# Patient Record
Sex: FEMALE | Race: WHITE | NOT HISPANIC OR LATINO | Employment: FULL TIME | URBAN - NONMETROPOLITAN AREA
[De-identification: names, ages, dates, MRNs, and addresses within clinical notes are randomized per-mention and may not be internally consistent; named-entity substitution may affect disease eponyms.]

---

## 2021-04-21 ENCOUNTER — OFFICE VISIT (OUTPATIENT)
Dept: OTOLARYNGOLOGY | Facility: CLINIC | Age: 51
End: 2021-04-21

## 2021-04-21 VITALS
WEIGHT: 166.6 LBS | BODY MASS INDEX: 27.76 KG/M2 | HEART RATE: 76 BPM | SYSTOLIC BLOOD PRESSURE: 151 MMHG | HEIGHT: 65 IN | DIASTOLIC BLOOD PRESSURE: 94 MMHG

## 2021-04-21 DIAGNOSIS — H81.01 MENIERE DISEASE, RIGHT: ICD-10-CM

## 2021-04-21 DIAGNOSIS — R42 DIZZINESS: ICD-10-CM

## 2021-04-21 DIAGNOSIS — H81.311 AUDITORY VERTIGO, RIGHT: Primary | ICD-10-CM

## 2021-04-21 PROCEDURE — 99204 OFFICE O/P NEW MOD 45 MIN: CPT | Performed by: OTOLARYNGOLOGY

## 2021-04-21 RX ORDER — MECLIZINE HYDROCHLORIDE 25 MG/1
25 TABLET ORAL EVERY 6 HOURS PRN
COMMUNITY
Start: 2021-02-22

## 2021-04-21 RX ORDER — HYDROCHLOROTHIAZIDE 12.5 MG/1
12.5 CAPSULE, GELATIN COATED ORAL EVERY MORNING
Qty: 30 CAPSULE | Refills: 3 | Status: SHIPPED | OUTPATIENT
Start: 2021-04-21

## 2021-04-21 RX ORDER — ASPIRIN 81 MG/1
81 TABLET ORAL 2 TIMES DAILY
COMMUNITY

## 2021-04-21 RX ORDER — CETIRIZINE HYDROCHLORIDE, PSEUDOEPHEDRINE HYDROCHLORIDE 5; 120 MG/1; MG/1
TABLET, FILM COATED, EXTENDED RELEASE ORAL
COMMUNITY
Start: 2021-03-29

## 2021-04-21 RX ORDER — ATORVASTATIN CALCIUM 20 MG/1
TABLET, FILM COATED ORAL
COMMUNITY
Start: 2021-03-23

## 2021-04-21 RX ORDER — ONDANSETRON 4 MG/1
4 TABLET, FILM COATED ORAL EVERY 6 HOURS PRN
COMMUNITY
Start: 2021-02-22

## 2021-04-21 RX ORDER — PREDNISONE 20 MG/1
TABLET ORAL
COMMUNITY
Start: 2021-03-29

## 2021-04-21 RX ORDER — METOPROLOL SUCCINATE 50 MG/1
50 TABLET, EXTENDED RELEASE ORAL DAILY
COMMUNITY

## 2021-04-21 NOTE — PROGRESS NOTES
[facility] [place] AND [place]ECK SURGERY CLINIC NOTE    Chief Complaint   Patient presents with   • Dizziness          HPI   New Patient  Accompanied by:  No one  Laura AGUILAR is a  51 y.o. female with dizziness.  She had first  Attack in 2012. She was told BPPV. Had attack with waking. She was given Epleys but made worse. She was treted like this for 8 yrs.  She would wake with googly eyes.  Last Feb,  She was at work. Head spun, had headache. She was hurting and nauseated.  Has had CT and MR scan.   Spinning last Feb, to the right.  Hearing- no  Change with episode  Pressure- she feels bith sides stuffed with cotton.  She was told stroke on CT and MRI.  She will stay still and take Antivert. Had 2 last year. This Feb, recurred. She now has head spinning, nausea and vomiting. Uses medications and patch. Will last 3-6 days. She will then resolve. She does not have headache. Feels like head has pressure.  Smoke- none  Drink- none  Rx- she has recently had Antivert and Zofran, Patch, Zyrtec, Pred. Pred makes her feel drunk.  She will stop medications when feeling better.      History     Last Reviewed by Cliff Zamora Jr., MD on 4/21/2021 at  2:54 PM    Sections Reviewed    Medical, Family, Tobacco, Surgical      Problem list reviewed by Cliff Zamora Jr., MD on 4/21/2021 at  2:54 PM  Medicines reviewed by Cliff Zamora Jr., MD on 4/21/2021 at  2:54 PM  Allergies reviewed by Cliff Zamora Jr., MD on 4/21/2021 at  2:54 PM         Vital Signs:   Heart Rate:  [76] 76  BP: (151)/(94) 151/94    Physical Exam  Vitals reviewed.   Constitutional:       Appearance: Normal appearance. She is well-developed and normal weight.   HENT:      Head: Normocephalic and atraumatic.      Right Ear: Hearing, tympanic membrane, ear canal and external ear normal.      Left Ear: Hearing, tympanic membrane, ear canal and external ear normal.      Nose: Nose normal.   Eyes:      General: Lids are normal.  Gaze aligned appropriately.      Extraocular Movements: Extraocular movements intact.      Conjunctiva/sclera: Conjunctivae normal.   Neck:      Thyroid: No thyroid mass or thyromegaly.      Vascular: No carotid bruit.      Trachea: Trachea and phonation normal.   Pulmonary:      Effort: Pulmonary effort is normal. No respiratory distress.      Breath sounds: No stridor.   Musculoskeletal:         General: Normal range of motion.      Cervical back: Normal range of motion.   Lymphadenopathy:      Cervical: No cervical adenopathy.   Skin:     Findings: No rash.   Neurological:      General: No focal deficit present.      Mental Status: She is alert and oriented to person, place, and time.      Cranial Nerves: Cranial nerves are intact. No cranial nerve deficit.   Psychiatric:         Attention and Perception: Attention and perception normal.         Mood and Affect: Mood and affect normal.         Behavior: Behavior normal. Behavior is cooperative.         Thought Content: Thought content normal.         Cognition and Memory: Cognition and memory normal.         Judgment: Judgment normal.             SnapShot   Notes   Encounters  Labs   Imaging   [email or web address]   Rslt Rev   :23}    Result Review    RESULTS REVIEW:    I have reviewed the patients old records in the chart.  The following results/records were reviewed:  I reviewed the patient's new imaging results and agree with the interpretation.  I have personally reviewed the audiogram with normal hearing.    HIM DELORIS Authorization - ENT - AUDIOLOGIC REPORT (04/21/2021)  REFERRAL/PRE-AUTH MRN - SCAN - ENT REFERRAL (03/17/2021)            Assessment:        Diagnosis Plan   1. Auditory vertigo, right      Possible Migrainous vertig   2. Meniere disease, right      Possible   3. Dizziness      in between vertigo episodes              Plan:        Conservative management.  Patient has a history that is more consistent with either Ménière syndrome or migrainous vertigo.   She does not have a history consistent with benign positional vertigo.  She is not steroid responsive as well, as she is worse on prednisone.  I will begin Ménière's regimen at this point in time.  I have discussed low-sodium diet, vestibular exercises, meclizine and Microzide.  I feel the patient should continue for evaluation by neurology to see if she has some component of migrainous vertigo and would benefit from migraine type medications.  I will follow closely since her MRI so far is negative.  Microzide daily  Meniere's regimen  Low Na diet  Exercise, vest  Continue Meclizine  Neurology referral per PCP     New Medications Ordered This Visit   Medications   • hydroCHLOROthiazide (MICROZIDE) 12.5 MG capsule     Sig: Take 1 capsule by mouth Every Morning.     Dispense:  30 capsule     Refill:  3          MY CHART:  Patient is Encouraged to enroll in My Chart  Encouraged to review data and findings in My Chart    Patient understand(s) and agree(s) with the treatment plan as described.    Return in about 6 weeks (around 6/2/2021) for Recheck Dizziness.            Cliff Zamora Jr, MD  04/21/21  14:59 CDT

## 2021-04-21 NOTE — PATIENT INSTRUCTIONS
"MENIERE'S REGIMEN:  Try to limit total sodium (salt intake to 2000 milligrams a day (you will need to look at packaging on foods and may have to look on the internet for sodium content of fresh    foods and vegetables  Low fat, low cholesterol diet  Avoid alcohol  Avoid Caffeine and other stimulants  Avoid recreational drugs  Vestibular exercises, inner ear exercises  Regular exercise, preferably aerobic if tolerated  Make sure you are doing all you can to optimize other medical conditions  Reduce high blood pressure, Control Diabetes, etc)      LOW SODIUM DIET:  Restrict total daily sodium intake to  2000 mg of sodium a day  Use distilled water to drink and cook if needed  Please look at labelling to determine the sodium content of foods  Use the internet to determine sodium content od unlabelled foods, especially the more common foods you consume  Avoid processed foods  Eat fresh vegetables and meats  (see Meniere's instruction sheet)    Use Antivert 2 times daily as needed for dizziness  Microzide daily in the AM.    LOW SODIUM DIET:  Restrict total daily sodium intake to  2000 mg of sodium a day  Use distilled water to drink and cook if needed  Please look at labelling to determine the sodium content of foods  Use the internet to determine sodium content od unlabelled foods, especially the more common foods you consume  Avoid processed foods  Eat fresh vegetables and meats  (see Meniere's instruction sheet)      VESTIBULAR EXERCISES:    Goals:  >To develop proprioceptive and visual mechanisms to compensate for a disturbance in balance function (labyrinthine system)  >To improve muscle coordination in general  T>o practice balancing under everyday conditions with special attention to using the eyes,  muscle and joint senses  >To train the movement of the eyes independent of the head  >To loosen the muscles of the neck and shoulder to overcome the protective muscular spasm and tendency to move \"in one piece\"  >To " practice head movements that cause dizziness and thus gradually overcome the disability  >To encourage the restoration of self-confidence and easy spontaneous motion     Exercise Program:  A. Eye exercises (while seated in bed). Perform 5 to 10 minutes at least 5 times each day; first do slowly, then quickly. Perform 20 times each.  1.     Up and down  2.     Side to side  3.     Diagonal  4.     Focus on finger moving from 3 feet to one foot from face    B. Head exercises. To be done at first slowly with eyes open in primary position, then quickly. Later do with eyes closed. Perform 20 times each.  1.     Bending forward and backward  2.     Turning from side to side  3.     Tilting from side to side  4.     Diagonal movements    C. Coordinate the movement of head and eyes in the same direction as in B.    D. Shoulder shrugging and circling. Perform 20 times each.    E. While seated, bend forward and  objects from the ground. Perform 20 times.    F.  Standing exercises. Perform 20 times each.  1. Repeat section A  2. Change from a sitting to a standing position with the eyes open and shut.  3. Throw ball from hand to hand, above eye level             4. Throw ball from hand to hand, under knee  5. Change form from sitting to standing, turning around in between     Full activity: Perform 10 times each.  1. Walk across room with eyes open, then closed  2. Walk up and down a slope with eyes open, then closed  3. Walk up and down steps with eyes open, then closed  4. Sit up and lie down in bed  5. Stand up and sit down in a chair  6. Recover balance when pushed in any direction  7. Throw and catch a ball  8. Any game involving stooping, straining and aiming     The following are of value in rehabilitating patients with balance problems:  1. A light cane may be used, not for walking, but to provide additional proprioceptive            orienting input.  2. While walking, the patient should not look down, but  rather select a distant point for          fixation.  3. Night-lights should be left on in the patient’s home.  4. In-patients with cervical spine problems, especially in those that head turning        increase unsteadiness, the use of soft foam cervical collar limits motion and improves         stability. Exercises should be modified accordingly.  5. Mild central stimulants (Ritalin, Caffeine) are useful in alerting the patient to respond       quickly to orienting input.  6. Optimal visual correction should be achieved and maintained. If cataract surgery is           being performed, then contact lens are recommended to avoid optical distortion.  7.  Extreme fatigue and stress is to be avoided, as this may worsen the dizziness.  8.  Sedatives, vestibular suppressants and tranquilizers (Valium, Phenergan, Antivert, Dramamine, Klonopin, etc.) are to be avoided, as this may slow compensation by the brain and cause drowsiness.      CONTACT INFORMATION:  The main office phone number is [phone]. For emergencies after hours and on weekends, this number will convert over to our answering service and the on call provider will answer. Please try to keep non emergent phone calls/ questions to office hours 9am-5pm Monday through Friday.     [employer]  As an alternative, you can sign up and use the Epic MyChart system for more direct and quicker access for non emergent questions/ problems.  [facility] Deskidea allows you to send messages to your doctor, view your test results, renew your prescriptions, schedule appointments, and more. To sign up, go to [employer] and click on the Sign Up Now link in the New User? box. Enter your Desk[employer] Activation Code exactly as it appears below along with the last four digits of your Social Security Number and your Date of Birth () to complete the sign-up process. If you do not sign up before the expiration date, you must request a new code.    [employer] Activation  Code: [email or web address]  Expires: 5/21/2021  2:13 PM    If you have questions, you can email [email or web address] or call [phone] to talk to our [employer] staff. Remember, [employer] is NOT to be used for urgent needs. For medical emergencies, dial 911.

## 2021-06-02 ENCOUNTER — OFFICE VISIT (OUTPATIENT)
Dept: OTOLARYNGOLOGY | Facility: CLINIC | Age: 51
End: 2021-06-02

## 2021-06-02 VITALS
HEIGHT: 65 IN | SYSTOLIC BLOOD PRESSURE: 131 MMHG | WEIGHT: 162 LBS | BODY MASS INDEX: 26.99 KG/M2 | DIASTOLIC BLOOD PRESSURE: 85 MMHG | HEART RATE: 63 BPM

## 2021-06-02 DIAGNOSIS — H81.311 AUDITORY VERTIGO, RIGHT: Primary | ICD-10-CM

## 2021-06-02 DIAGNOSIS — R42 DIZZINESS: ICD-10-CM

## 2021-06-02 DIAGNOSIS — H81.01 MENIERE DISEASE, RIGHT: ICD-10-CM

## 2021-06-02 PROCEDURE — 99213 OFFICE O/P EST LOW 20 MIN: CPT | Performed by: OTOLARYNGOLOGY

## 2021-06-02 NOTE — PROGRESS NOTES
[facility] Otolaryngology Head and Neck Surgery  CLINIC NOTE    Chief Complaint   Patient presents with   • Follow-up     recheck dizziness          HPI   Follow up  Accompanied by:  No one  Laura Melo is a 51 y.o. female who presents for follow up with no acute complaints. She was watching salt but has loosened the restrictions.  She is status post No surgery found on No surgery found.        History     Last Reviewed by Cliff Zamora Jr., MD on 6/2/2021 at  9:24 AM    Sections Reviewed    Medical, Family, Tobacco, Surgical      Problem list reviewed by Cliff Zamora Jr., MD on 6/2/2021 at  9:24 AM  Medicines reviewed by Cliff Zamora Jr., MD on 6/2/2021 at  9:24 AM  Allergies reviewed by Cliff Zamora Jr., MD on 6/2/2021 at  9:24 AM         Vital Signs:   Heart Rate:  [63] 63  BP: (131)/(85) 131/85    Physical Exam  Vitals reviewed.   Constitutional:       Appearance: Normal appearance. She is well-developed and normal weight.   HENT:      Head: Normocephalic and atraumatic.      Right Ear: Hearing, tympanic membrane, ear canal and external ear normal.      Left Ear: Hearing, tympanic membrane, ear canal and external ear normal.      Nose: Nose normal.   Eyes:      General: Lids are normal. Gaze aligned appropriately.      Extraocular Movements: Extraocular movements intact.      Conjunctiva/sclera: Conjunctivae normal.   Neck:      Thyroid: No thyroid mass or thyromegaly.      Vascular: No carotid bruit.      Trachea: Trachea and phonation normal.   Pulmonary:      Effort: Pulmonary effort is normal. No respiratory distress.      Breath sounds: No stridor.   Musculoskeletal:         General: Normal range of motion.      Cervical back: Normal range of motion.   Lymphadenopathy:      Cervical: No cervical adenopathy.   Skin:     Findings: No rash.   Neurological:      General: No focal deficit present.      Mental Status: She is alert and oriented to person,  place, and time.      Cranial Nerves: Cranial nerves are intact. No cranial nerve deficit.   Psychiatric:         Attention and Perception: Attention and perception normal.         Mood and Affect: Mood and affect normal.         Behavior: Behavior normal. Behavior is cooperative.         Thought Content: Thought content normal.         Cognition and Memory: Cognition and memory normal.         Judgment: Judgment normal.             SnapShot   Notes   Encounters  Labs   Imaging   [employer]   Rslt Rev   :23}    Result Review    RESULTS REVIEW:    I have reviewed the patients old records in the chart.            Assessment:        Diagnosis Plan   1. Auditory vertigo, right      Resolved   2. Meniere disease, right      Improved control   3. Dizziness      Resolved              Plan:        Continue current management plan.  Patient doing well on current regimen. She is exercising now.  Meniere's regimen  Diet- Low Na  Exercise             MY CHART:  Patient is Encouraged to enroll in My Chart  Encouraged to review data and findings in My Chart    Patient understand(s) and agree(s) with the treatment plan as described.    Return in about 6 months (around 12/2/2021) for Recheck Meniere's.            Cliff Zamora Jr, MD  06/02/21  09:25 CDT

## 2021-06-02 NOTE — PATIENT INSTRUCTIONS
JORJE'S REGIMEN:  Try to limit total sodium (salt intake to 2000 milligrams a day (you will need to look at packaging on foods and may have to look on the internet for sodium content of fresh    foods and vegetables  Low fat, low cholesterol diet  Avoid alcohol  Avoid Caffeine and other stimulants  Avoid recreational drugs  Vestibular exercises, inner ear exercises  Regular exercise, preferably aerobic if tolerated  Make sure you are doing all you can to optimize other medical conditions  Reduce high blood pressure, Control Diabetes, etc)    LOW SODIUM DIET:  Restrict total daily sodium intake to  2000 mg of sodium a day  Use distilled water to drink and cook if needed  Please look at labelling to determine the sodium content of foods  Use the internet to determine sodium content od unlabelled foods, especially the more common foods you consume  Avoid processed foods  Eat fresh vegetables and meats  (see Jorje's instruction sheet)    Diet  Exercise    CONTACT INFORMATION:  The main office phone number is [phone]. For emergencies after hours and on weekends, this number will convert over to our answering service and the on call provider will answer. Please try to keep non emergent phone calls/ questions to office hours 9am-5pm Monday through Friday.     [employer]  As an alternative, you can sign up and use the Epic MyChart system for more direct and quicker access for non emergent questions/ problems.  [facility] [employer] allows you to send messages to your doctor, view your test results, renew your prescriptions, schedule appointments, and more. To sign up, go to [employer] and click on the Sign Up Now link in the New User? box. Enter your [employer] Activation Code exactly as it appears below along with the last four digits of your Social Security Number and your Date of Birth () to complete the sign-up process. If you do not sign up before the expiration date, you must request a new  code.    [employer] Activation Code: [email or web address]  Expires: 7/2/2021  9:04 AM    If you have questions, you can email [email or web address] or call [phone] to talk to our [employer] staff. Remember, [employer] is NOT to be used for urgent needs. For medical emergencies, dial 911.

## 2021-12-03 ENCOUNTER — OFFICE VISIT (OUTPATIENT)
Dept: OTOLARYNGOLOGY | Facility: CLINIC | Age: 51
End: 2021-12-03

## 2021-12-03 VITALS
DIASTOLIC BLOOD PRESSURE: 86 MMHG | BODY MASS INDEX: 27.16 KG/M2 | SYSTOLIC BLOOD PRESSURE: 127 MMHG | WEIGHT: 163 LBS | TEMPERATURE: 97.7 F | HEIGHT: 65 IN | HEART RATE: 92 BPM

## 2021-12-03 DIAGNOSIS — H81.311 AUDITORY VERTIGO, RIGHT: Primary | ICD-10-CM

## 2021-12-03 DIAGNOSIS — H61.21 IMPACTED CERUMEN OF RIGHT EAR: ICD-10-CM

## 2021-12-03 DIAGNOSIS — R42 DIZZINESS: ICD-10-CM

## 2021-12-03 PROCEDURE — 69210 REMOVE IMPACTED EAR WAX UNI: CPT | Performed by: EMERGENCY MEDICINE

## 2021-12-03 PROCEDURE — 99213 OFFICE O/P EST LOW 20 MIN: CPT | Performed by: EMERGENCY MEDICINE

## 2021-12-03 NOTE — PATIENT INSTRUCTIONS
CONTACT INFORMATION:  The main office phone number is [phone]. For emergencies after hours and on weekends, this number will convert over to our answering service and the on call provider will answer. Please try to keep non emergent phone calls/ questions to office hours 9am-5pm Monday through Friday.     [employer]  As an alternative, you can sign up and use the Epic MyChart system for more direct and quicker access for non emergent questions/ problems.  [facility] Senseg allows you to send messages to your doctor, view your test results, renew your prescriptions, schedule appointments, and more. To sign up, go to [employer] and click on the Sign Up Now link in the New User? box. Enter your [employer] Activation Code exactly as it appears below along with the last four digits of your Social Security Number and your Date of Birth () to complete the sign-up process. If you do not sign up before the expiration date, you must request a new code.    Senseg Activation Code: [email or web address]  Expires: 2022  8:54 AM    If you have questions, you can email [email or web address] or call [phone] to talk to our [employer] staff. Remember, Senseg is NOT to be used for urgent needs. For medical emergencies, dial 911.

## 2021-12-03 NOTE — PROGRESS NOTES
GEORGIA Pinto  [facility] EAR NOSE & THROAT  [address], [address]  [place] [address]  Fax [phone]  Phone [phone]    Visit Type: FOLLOW UP   Chief Complaint   Patient presents with   • Dizziness        HPI  She presents for a follow up evaluation. She reports dizziness has resolved. She was evaluated by neurology who does not believe her dizziness was related to her ears.  She has been taking zyrtec and microzide, she would like to stop taking microzide due to dry mouth.        History     Last Reviewed by Margaret Ngo APRN on 12/3/2021 at  9:34 AM    Sections Reviewed    Medical, Family, Tobacco, Surgical      Problem list reviewed by Margaret Ngo APRN on 12/3/2021 at  9:34 AM  Medicines reviewed by Margaret Ngo APRN on 12/3/2021 at  9:34 AM  Allergies reviewed by Margaret Ngo APRN on 12/3/2021 at  9:34 AM        Vital Signs:   Temp:  [97.7 °F (36.5 °C)] 97.7 °F (36.5 °C)  Heart Rate:  [92] 92  BP: (127)/(86) 127/86  ENT Physical Exam  Constitutional  Appearance: patient appears well-developed, well-nourished and well-groomed,  Communication/Voice: communication appropriate for developmental age; vocal quality normal;  Ear  Hearing: intact;  Auricles: right auricle normal; left auricle normal;  External Mastoids: right external mastoid normal; left external mastoid normal;  Ear Canals: right ear canal impacted cerumen observed; left ear canal normal;  Tympanic Membranes: left tympanic membrane normal;     Ear Cerumen Removal    Date/Time: 12/3/2021 9:37 AM  Performed by: Margaret Ngo APRN  Authorized by: Margaret Ngo APRN   Consent: Verbal consent obtained.  Consent given by: patient  Patient understanding: patient states understanding of the procedure being performed  Patient identity confirmed: verbally with patient    Anesthesia:  Local Anesthetic: none  Location details: right ear  Patient  tolerance: patient tolerated the procedure well with no immediate complications  Comments: TM intact  Procedure type: instrumentation, alligator forceps, curette   Sedation:  Patient sedated: no           Result Review    RESULTS REVIEW    I have reviewed the patients old records in the chart.     Assessment/Plan    Diagnoses and all orders for this visit:    1. Auditory vertigo, right (Primary)  -     Comprehensive Hearing Test; Future    2. Dizziness  -     Comprehensive Hearing Test; Future    3. Impacted cerumen of right ear    Other orders  -     Ear Cerumen Removal            We will wean patient off microzide due to side effects.  Audio in 6 months.  If she continues to do well with no evidence of hearing loss or dizziness, she may follow up as needed.      Return in about 6 months (around 6/3/2022) for Follow up with Audiogram.      Margaret Ngo, APRN  12/03/21  09:37 CST

## 2022-06-02 NOTE — PROGRESS NOTES
AUDIOMETRIC EVALUATION      Name:  Laura Melo  :  1970  Age:  52 y.o.  Date of Evaluation:  2022       History:  Reason for visit:  Ms. Melo is seen today at the request of GEORGIA Durant for a hearing evaluation. Patient was seen by ENT provider on 2021 for vertigo and impacted cerumen, right ear. Patient reports she has not had vertigo subsided back in 2021. Patient states she has been slowly taking herself off her dizziness medicine and has not had any problems since. [facility] She does not think her hearing has changed.    PE Tubes:  no, both ears   Other otologic surgical history: no, both ears  Tinnitus:  no, both ears  Dizziness:  no  Noise Exposure: no  Aural Fullness:  no, both ears  Otalgia: no, both ears  Family history of hearing loss: yes, father and mother  Other significant history: high blood pressure      EVALUATION:          RESULTS:    Otoscopic Evaluation:  Bilateral: clear canal, tympanic membrane visualized     Tympanometry (226 Hz):  Bilateral: Type A- normal    Pure Tone Audiometry:    Bilateral: mild high frequency hearing loss at 6kHz    Speech Audiometry:   Bilateral: Speech Reception Threshold (SRT) was obtained at 15 dBHL  Word Recognition scores- excellent/within normal limits (90 - 100%) using NU-6 List 4A, 25 words    IMPRESSIONS:  Tympanometry showed normal middle ear pressure and static compliance, for both ears. Pure tone thresholds for both ears show a mild high frequency hearing loss at 6kHz. Hearing loss is likely sensorineural due to normal tympanograms. No significant changes compared to audio in , however note  audio did not test 6kHz. Patient was counseled with regard to the findings.    Diagnosis:  1. High frequency hearing loss, bilateral         RECOMMENDATIONS/PLAN:  Follow-up recommendations per GEORGIA Durant    Return for audiologic testing if noticing  changes in hearing or concerns arise  Use communication strategies  Use hearing protection around loud noises     EDUCATION:  Discussed results and recommendations with patient. Questions were addressed and the patient was encouraged to contact our department should concerns arise.        RACHNA López  Licensed Audiologist

## 2022-06-03 ENCOUNTER — PROCEDURE VISIT (OUTPATIENT)
Dept: OTOLARYNGOLOGY | Facility: CLINIC | Age: 52
End: 2022-06-03

## 2022-06-03 ENCOUNTER — OFFICE VISIT (OUTPATIENT)
Dept: OTOLARYNGOLOGY | Facility: CLINIC | Age: 52
End: 2022-06-03

## 2022-06-03 VITALS
BODY MASS INDEX: 27.49 KG/M2 | HEIGHT: 65 IN | DIASTOLIC BLOOD PRESSURE: 81 MMHG | SYSTOLIC BLOOD PRESSURE: 154 MMHG | HEART RATE: 72 BPM | TEMPERATURE: 97.7 F | WEIGHT: 165 LBS

## 2022-06-03 DIAGNOSIS — R42 DIZZINESS: Primary | ICD-10-CM

## 2022-06-03 DIAGNOSIS — H91.93 HIGH FREQUENCY HEARING LOSS, BILATERAL: Primary | ICD-10-CM

## 2022-06-03 PROCEDURE — 99212 OFFICE O/P EST SF 10 MIN: CPT | Performed by: EMERGENCY MEDICINE

## 2022-06-03 PROCEDURE — 92557 COMPREHENSIVE HEARING TEST: CPT

## 2022-06-03 PROCEDURE — 92567 TYMPANOMETRY: CPT

## 2022-06-03 NOTE — PROGRESS NOTES
[place] [facility] EAR NOSE & THROAT  [address], [address]  [facility] [address]  Fax [phone]  Phone [phone]      Visit Type: FOLLOW UP   Chief Complaint   Patient presents with   • Ear Problem        HPI  She presents for a follow up evaluation. She has had no current complaints. The patient has not had complaints of: dizziness.     Past Medical History:   Diagnosis Date   • Hypertension        Past Surgical History:   Procedure Laterality Date   • BREAST AUGMENTATION     • CYST REMOVAL      left neck   • TUBAL ABDOMINAL LIGATION         Family History: Her family history is not on file.     Social History: She  reports that she has never smoked. She has never used smokeless tobacco. She reports previous alcohol use. She reports that she does not use drugs.    Home Medications:  aspirin, atorvastatin, cetirizine-pseudoephedrine, hydroCHLOROthiazide, meclizine, metoprolol succinate XL, ondansetron, and predniSONE    Allergies:  She has No Known Allergies.       Vital Signs:   Temp:  [97.7 °F (36.5 °C)] 97.7 °F (36.5 °C)  Heart Rate:  [72] 72  BP: (154)/(81) 154/81  ENT Physical Exam  Constitutional  Appearance: patient appears well-developed, well-nourished and well-groomed,  Communication/Voice: communication appropriate for developmental age; vocal quality normal;  Head and Face  Appearance: head appears normal, face appears normal and face appears atraumatic;  Palpation: facial palpation normal;  Salivary: glands normal;  Ear  Hearing: intact;  Auricles: right auricle normal; left auricle normal;  External Mastoids: right external mastoid normal; left external mastoid normal;  Ear Canals: right ear canal normal; left ear canal normal;  Tympanic Membranes: right tympanic membrane normal; left tympanic membrane normal;  Nose  External Nose: nares patent bilaterally; external nose normal;  Internal Nose: nasal mucosa normal;  septum normal; bilateral inferior turbinates normal;  Neurovestibular  Mental Status: alert and oriented;  Cranial Nerves: cranial nerves intact; facial sensation normal;         Result Review    RESULTS REVIEW    I have reviewed the patients old records in the chart.     Assessment & Plan    Diagnoses and all orders for this visit:    1. Dizziness (Primary)            Use hearing protection in loud noise situations.  Obtain a yearly audiogram to follow hearing.  Follow a low salt diet to try to prevent inner ear fluid accumulation.  Decrease caffeine, avoid red wine, nitrites in cured meats, food additives (like monosodium glutamate/ MSG) and dyes.  Follow the instructions for [place] exersises.      Return if symptoms worsen or fail to improve.      Margaret Ngo, [place]  06/03/22  09:28 CDT

## 2025-07-08 ENCOUNTER — HOSPITAL ENCOUNTER (INPATIENT)
Facility: HOSPITAL | Age: 55
LOS: 2 days | Discharge: HOME | End: 2025-07-10
Attending: HOSPITALIST | Admitting: INTERNAL MEDICINE
Payer: COMMERCIAL

## 2025-07-08 DIAGNOSIS — R06.2 WHEEZING: Primary | ICD-10-CM

## 2025-07-08 LAB
ALBUMIN SERPL-MCNC: 4 G/DL (ref 3.5–5.2)
ALBUMIN/GLOB SERPL: 1.3 G/DL
ALP SERPL-CCNC: 145 U/L (ref 39–117)
ALT SERPL W P-5'-P-CCNC: 129 U/L (ref 1–33)
ANION GAP SERPL CALCULATED.3IONS-SCNC: 14 MMOL/L (ref 5–15)
APTT PPP: 21.4 SECONDS (ref 24.5–36)
AST SERPL-CCNC: 50 U/L (ref 1–32)
BASOPHILS # BLD AUTO: 0.09 10*3/MM3 (ref 0–0.2)
BASOPHILS NFR BLD AUTO: 0.4 % (ref 0–1.5)
BILIRUB SERPL-MCNC: 0.4 MG/DL (ref 0–1.2)
BUN SERPL-MCNC: 23 MG/DL (ref 6–20)
BUN/CREAT SERPL: 29.1 (ref 7–25)
CALCIUM SPEC-SCNC: 9.1 MG/DL (ref 8.6–10.5)
CHLORIDE SERPL-SCNC: 104 MMOL/L (ref 98–107)
CO2 SERPL-SCNC: 24 MMOL/L (ref 22–29)
CREAT SERPL-MCNC: 0.79 MG/DL (ref 0.57–1)
CRP SERPL-MCNC: <0.3 MG/DL (ref 0–0.5)
D-LACTATE SERPL-SCNC: 2 MMOL/L (ref 0.5–2)
DEPRECATED RDW RBC AUTO: 44.2 FL (ref 37–54)
EGFRCR SERPLBLD CKD-EPI 2021: 87.4 ML/MIN/1.73
EOSINOPHIL # BLD AUTO: 0 10*3/MM3 (ref 0–0.4)
EOSINOPHIL NFR BLD AUTO: 0 % (ref 0.3–6.2)
ERYTHROCYTE [DISTWIDTH] IN BLOOD BY AUTOMATED COUNT: 13.8 % (ref 12.3–15.4)
GLOBULIN UR ELPH-MCNC: 3.2 GM/DL
GLUCOSE SERPL-MCNC: 129 MG/DL (ref 65–99)
HCT VFR BLD AUTO: 34.1 % (ref 34–46.6)
HGB BLD-MCNC: 11.7 G/DL (ref 12–15.9)
IMM GRANULOCYTES # BLD AUTO: 0.89 10*3/MM3 (ref 0–0.05)
IMM GRANULOCYTES NFR BLD AUTO: 4.1 % (ref 0–0.5)
INR PPP: 1.05 (ref 0.91–1.09)
LYMPHOCYTES # BLD AUTO: 2.13 10*3/MM3 (ref 0.7–3.1)
LYMPHOCYTES NFR BLD AUTO: 9.7 % (ref 19.6–45.3)
MAGNESIUM SERPL-MCNC: 2.3 MG/DL (ref 1.6–2.6)
MCH RBC QN AUTO: 29.8 PG (ref 26.6–33)
MCHC RBC AUTO-ENTMCNC: 34.3 G/DL (ref 31.5–35.7)
MCV RBC AUTO: 86.8 FL (ref 79–97)
MONOCYTES # BLD AUTO: 1.74 10*3/MM3 (ref 0.1–0.9)
MONOCYTES NFR BLD AUTO: 8 % (ref 5–12)
NEUTROPHILS NFR BLD AUTO: 17.01 10*3/MM3 (ref 1.7–7)
NEUTROPHILS NFR BLD AUTO: 77.8 % (ref 42.7–76)
NRBC BLD AUTO-RTO: 0 /100 WBC (ref 0–0.2)
PHOSPHATE SERPL-MCNC: 3.1 MG/DL (ref 2.5–4.5)
PLATELET # BLD AUTO: 197 10*3/MM3 (ref 140–450)
PMV BLD AUTO: 10.3 FL (ref 6–12)
POTASSIUM SERPL-SCNC: 3.3 MMOL/L (ref 3.5–5.2)
PROT SERPL-MCNC: 7.2 G/DL (ref 6–8.5)
PROTHROMBIN TIME: 14.3 SECONDS (ref 11.8–14.8)
RBC # BLD AUTO: 3.93 10*6/MM3 (ref 3.77–5.28)
SODIUM SERPL-SCNC: 142 MMOL/L (ref 136–145)
WBC NRBC COR # BLD AUTO: 21.86 10*3/MM3 (ref 3.4–10.8)

## 2025-07-08 PROCEDURE — 84145 PROCALCITONIN (PCT): CPT

## 2025-07-08 PROCEDURE — 93005 ELECTROCARDIOGRAM TRACING: CPT

## 2025-07-08 PROCEDURE — 86140 C-REACTIVE PROTEIN: CPT

## 2025-07-08 PROCEDURE — 85730 THROMBOPLASTIN TIME PARTIAL: CPT

## 2025-07-08 PROCEDURE — 80053 COMPREHEN METABOLIC PANEL: CPT

## 2025-07-08 PROCEDURE — 0202U NFCT DS 22 TRGT SARS-COV-2: CPT

## 2025-07-08 PROCEDURE — 87040 BLOOD CULTURE FOR BACTERIA: CPT

## 2025-07-08 PROCEDURE — 84100 ASSAY OF PHOSPHORUS: CPT

## 2025-07-08 PROCEDURE — 83735 ASSAY OF MAGNESIUM: CPT

## 2025-07-08 PROCEDURE — 83605 ASSAY OF LACTIC ACID: CPT

## 2025-07-08 PROCEDURE — 85025 COMPLETE CBC W/AUTO DIFF WBC: CPT

## 2025-07-08 PROCEDURE — 85610 PROTHROMBIN TIME: CPT

## 2025-07-08 PROCEDURE — 87641 MR-STAPH DNA AMP PROBE: CPT

## 2025-07-08 RX ORDER — PAROXETINE 10 MG/1
10 TABLET, FILM COATED ORAL EVERY MORNING
COMMUNITY

## 2025-07-09 ENCOUNTER — APPOINTMENT (OUTPATIENT)
Dept: GENERAL RADIOLOGY | Facility: HOSPITAL | Age: 55
End: 2025-07-09
Payer: COMMERCIAL

## 2025-07-09 ENCOUNTER — APPOINTMENT (OUTPATIENT)
Dept: ULTRASOUND IMAGING | Facility: HOSPITAL | Age: 55
End: 2025-07-09
Payer: COMMERCIAL

## 2025-07-09 PROBLEM — I10 BENIGN ESSENTIAL HTN: Status: ACTIVE | Noted: 2025-07-09

## 2025-07-09 PROBLEM — B34.8 INFECTION DUE TO HUMAN METAPNEUMOVIRUS (HMPV): Status: ACTIVE | Noted: 2025-07-09

## 2025-07-09 PROBLEM — E87.6 HYPOKALEMIA: Status: ACTIVE | Noted: 2025-07-09

## 2025-07-09 PROBLEM — R79.89 ELEVATED LFTS: Status: ACTIVE | Noted: 2025-07-09

## 2025-07-09 PROBLEM — J18.9 PNEUMONIA: Status: ACTIVE | Noted: 2025-07-09

## 2025-07-09 LAB
ALBUMIN SERPL-MCNC: 3.7 G/DL (ref 3.5–5.2)
ALBUMIN/GLOB SERPL: 1.3 G/DL
ALP SERPL-CCNC: 132 U/L (ref 39–117)
ALT SERPL W P-5'-P-CCNC: 111 U/L (ref 1–33)
ANION GAP SERPL CALCULATED.3IONS-SCNC: 12 MMOL/L (ref 5–15)
AST SERPL-CCNC: 44 U/L (ref 1–32)
B PARAPERT DNA SPEC QL NAA+PROBE: NOT DETECTED
B PERT DNA SPEC QL NAA+PROBE: NOT DETECTED
BILIRUB SERPL-MCNC: 0.4 MG/DL (ref 0–1.2)
BUN SERPL-MCNC: 23.1 MG/DL (ref 6–20)
BUN/CREAT SERPL: 29.2 (ref 7–25)
C PNEUM DNA NPH QL NAA+NON-PROBE: NOT DETECTED
CALCIUM SPEC-SCNC: 8.7 MG/DL (ref 8.6–10.5)
CHLORIDE SERPL-SCNC: 105 MMOL/L (ref 98–107)
CO2 SERPL-SCNC: 25 MMOL/L (ref 22–29)
CREAT SERPL-MCNC: 0.79 MG/DL (ref 0.57–1)
DEPRECATED RDW RBC AUTO: 44.4 FL (ref 37–54)
EGFRCR SERPLBLD CKD-EPI 2021: 87.4 ML/MIN/1.73
ERYTHROCYTE [DISTWIDTH] IN BLOOD BY AUTOMATED COUNT: 13.8 % (ref 12.3–15.4)
FLUAV SUBTYP SPEC NAA+PROBE: NOT DETECTED
FLUBV RNA NPH QL NAA+NON-PROBE: NOT DETECTED
GLOBULIN UR ELPH-MCNC: 2.9 GM/DL
GLUCOSE SERPL-MCNC: 116 MG/DL (ref 65–99)
HADV DNA SPEC NAA+PROBE: NOT DETECTED
HCOV 229E RNA SPEC QL NAA+PROBE: NOT DETECTED
HCOV HKU1 RNA SPEC QL NAA+PROBE: NOT DETECTED
HCOV NL63 RNA SPEC QL NAA+PROBE: NOT DETECTED
HCOV OC43 RNA SPEC QL NAA+PROBE: NOT DETECTED
HCT VFR BLD AUTO: 33.5 % (ref 34–46.6)
HGB BLD-MCNC: 11.1 G/DL (ref 12–15.9)
HMPV RNA NPH QL NAA+NON-PROBE: DETECTED
HPIV1 RNA ISLT QL NAA+PROBE: NOT DETECTED
HPIV2 RNA SPEC QL NAA+PROBE: NOT DETECTED
HPIV3 RNA NPH QL NAA+PROBE: NOT DETECTED
HPIV4 P GENE NPH QL NAA+PROBE: NOT DETECTED
M PNEUMO IGG SER IA-ACNC: NOT DETECTED
MAGNESIUM SERPL-MCNC: 2.2 MG/DL (ref 1.6–2.6)
MCH RBC QN AUTO: 29.1 PG (ref 26.6–33)
MCHC RBC AUTO-ENTMCNC: 33.1 G/DL (ref 31.5–35.7)
MCV RBC AUTO: 87.9 FL (ref 79–97)
MRSA DNA SPEC QL NAA+PROBE: NORMAL
PHOSPHATE SERPL-MCNC: 3.4 MG/DL (ref 2.5–4.5)
PLATELET # BLD AUTO: 188 10*3/MM3 (ref 140–450)
PMV BLD AUTO: 10.5 FL (ref 6–12)
POTASSIUM SERPL-SCNC: 3.7 MMOL/L (ref 3.5–5.2)
PROCALCITONIN SERPL-MCNC: 0.03 NG/ML (ref 0–0.25)
PROT SERPL-MCNC: 6.6 G/DL (ref 6–8.5)
RBC # BLD AUTO: 3.81 10*6/MM3 (ref 3.77–5.28)
RHINOVIRUS RNA SPEC NAA+PROBE: NOT DETECTED
RSV RNA NPH QL NAA+NON-PROBE: NOT DETECTED
SARS-COV-2 RNA RESP QL NAA+PROBE: NOT DETECTED
SODIUM SERPL-SCNC: 142 MMOL/L (ref 136–145)
WBC NRBC COR # BLD AUTO: 18.35 10*3/MM3 (ref 3.4–10.8)

## 2025-07-09 PROCEDURE — 80053 COMPREHEN METABOLIC PANEL: CPT

## 2025-07-09 PROCEDURE — 84100 ASSAY OF PHOSPHORUS: CPT

## 2025-07-09 PROCEDURE — 71045 X-RAY EXAM CHEST 1 VIEW: CPT

## 2025-07-09 PROCEDURE — 85027 COMPLETE CBC AUTOMATED: CPT

## 2025-07-09 PROCEDURE — 83735 ASSAY OF MAGNESIUM: CPT

## 2025-07-09 PROCEDURE — 99222 1ST HOSP IP/OBS MODERATE 55: CPT | Performed by: INTERNAL MEDICINE

## 2025-07-09 PROCEDURE — 63710000001 PREDNISONE PER 1 MG: Performed by: INTERNAL MEDICINE

## 2025-07-09 PROCEDURE — 76705 ECHO EXAM OF ABDOMEN: CPT

## 2025-07-09 PROCEDURE — 25010000002 DOXYCYCLINE 100 MG RECONSTITUTED SOLUTION 1 EACH VIAL

## 2025-07-09 PROCEDURE — 25010000002 CEFEPIME PER 500 MG

## 2025-07-09 RX ORDER — PREDNISONE 20 MG/1
40 TABLET ORAL
Status: DISCONTINUED | OUTPATIENT
Start: 2025-07-09 | End: 2025-07-10 | Stop reason: HOSPADM

## 2025-07-09 RX ORDER — POTASSIUM CHLORIDE 1500 MG/1
40 TABLET, EXTENDED RELEASE ORAL ONCE
Status: COMPLETED | OUTPATIENT
Start: 2025-07-09 | End: 2025-07-09

## 2025-07-09 RX ORDER — NITROGLYCERIN 0.4 MG/1
0.4 TABLET SUBLINGUAL
Status: DISCONTINUED | OUTPATIENT
Start: 2025-07-09 | End: 2025-07-10 | Stop reason: HOSPADM

## 2025-07-09 RX ORDER — METOPROLOL SUCCINATE 25 MG/1
25 TABLET, EXTENDED RELEASE ORAL DAILY
Status: DISCONTINUED | OUTPATIENT
Start: 2025-07-09 | End: 2025-07-10 | Stop reason: HOSPADM

## 2025-07-09 RX ORDER — ACETAMINOPHEN 325 MG/1
650 TABLET ORAL EVERY 6 HOURS PRN
Status: DISCONTINUED | OUTPATIENT
Start: 2025-07-09 | End: 2025-07-10 | Stop reason: HOSPADM

## 2025-07-09 RX ORDER — IPRATROPIUM BROMIDE AND ALBUTEROL SULFATE 2.5; .5 MG/3ML; MG/3ML
3 SOLUTION RESPIRATORY (INHALATION) EVERY 4 HOURS PRN
Status: DISCONTINUED | OUTPATIENT
Start: 2025-07-09 | End: 2025-07-10 | Stop reason: HOSPADM

## 2025-07-09 RX ORDER — SODIUM CHLORIDE 0.9 % (FLUSH) 0.9 %
10 SYRINGE (ML) INJECTION EVERY 12 HOURS SCHEDULED
Status: DISCONTINUED | OUTPATIENT
Start: 2025-07-09 | End: 2025-07-10 | Stop reason: HOSPADM

## 2025-07-09 RX ORDER — SODIUM CHLORIDE 0.9 % (FLUSH) 0.9 %
10 SYRINGE (ML) INJECTION AS NEEDED
Status: DISCONTINUED | OUTPATIENT
Start: 2025-07-09 | End: 2025-07-10 | Stop reason: HOSPADM

## 2025-07-09 RX ORDER — SODIUM CHLORIDE 9 MG/ML
40 INJECTION, SOLUTION INTRAVENOUS AS NEEDED
Status: DISCONTINUED | OUTPATIENT
Start: 2025-07-09 | End: 2025-07-10 | Stop reason: HOSPADM

## 2025-07-09 RX ORDER — METOPROLOL SUCCINATE 50 MG/1
50 TABLET, EXTENDED RELEASE ORAL DAILY
Status: DISCONTINUED | OUTPATIENT
Start: 2025-07-09 | End: 2025-07-09

## 2025-07-09 RX ORDER — ATORVASTATIN CALCIUM 20 MG/1
20 TABLET, FILM COATED ORAL DAILY
COMMUNITY

## 2025-07-09 RX ORDER — LEVALBUTEROL INHALATION SOLUTION 0.63 MG/3ML
0.63 SOLUTION RESPIRATORY (INHALATION) EVERY 8 HOURS PRN
Status: DISCONTINUED | OUTPATIENT
Start: 2025-07-09 | End: 2025-07-10 | Stop reason: HOSPADM

## 2025-07-09 RX ORDER — ASPIRIN 81 MG/1
81 TABLET ORAL DAILY
Status: DISCONTINUED | OUTPATIENT
Start: 2025-07-09 | End: 2025-07-10 | Stop reason: HOSPADM

## 2025-07-09 RX ORDER — FAMOTIDINE 20 MG/1
20 TABLET, FILM COATED ORAL
Status: DISCONTINUED | OUTPATIENT
Start: 2025-07-09 | End: 2025-07-10 | Stop reason: HOSPADM

## 2025-07-09 RX ORDER — GUAIFENESIN AND DEXTROMETHORPHAN HYDROBROMIDE 600; 30 MG/1; MG/1
1 TABLET, EXTENDED RELEASE ORAL 2 TIMES DAILY
Status: DISCONTINUED | OUTPATIENT
Start: 2025-07-09 | End: 2025-07-10 | Stop reason: HOSPADM

## 2025-07-09 RX ADMIN — CEFEPIME 2000 MG: 2 INJECTION, POWDER, FOR SOLUTION INTRAVENOUS at 10:00

## 2025-07-09 RX ADMIN — GUAIFENESIN AND DEXTROMETHORPHAN HYDROBROMIDE 1 TABLET: 600; 30 TABLET, EXTENDED RELEASE ORAL at 05:17

## 2025-07-09 RX ADMIN — Medication 10 ML: at 10:00

## 2025-07-09 RX ADMIN — POTASSIUM CHLORIDE 40 MEQ: 1500 TABLET, EXTENDED RELEASE ORAL at 00:45

## 2025-07-09 RX ADMIN — CEFEPIME 2000 MG: 2 INJECTION, POWDER, FOR SOLUTION INTRAVENOUS at 17:33

## 2025-07-09 RX ADMIN — ASPIRIN 81 MG: 81 TABLET, COATED ORAL at 11:31

## 2025-07-09 RX ADMIN — GUAIFENESIN AND DEXTROMETHORPHAN HYDROBROMIDE 1 TABLET: 600; 30 TABLET, EXTENDED RELEASE ORAL at 10:00

## 2025-07-09 RX ADMIN — FAMOTIDINE 20 MG: 20 TABLET, FILM COATED ORAL at 17:33

## 2025-07-09 RX ADMIN — GUAIFENESIN AND DEXTROMETHORPHAN HYDROBROMIDE 1 TABLET: 600; 30 TABLET, EXTENDED RELEASE ORAL at 21:24

## 2025-07-09 RX ADMIN — Medication 10 ML: at 21:24

## 2025-07-09 RX ADMIN — Medication 10 ML: at 00:45

## 2025-07-09 RX ADMIN — DOXYCYCLINE 100 MG: 100 INJECTION, POWDER, LYOPHILIZED, FOR SOLUTION INTRAVENOUS at 13:48

## 2025-07-09 RX ADMIN — DOXYCYCLINE 100 MG: 100 INJECTION, POWDER, LYOPHILIZED, FOR SOLUTION INTRAVENOUS at 00:45

## 2025-07-09 RX ADMIN — CEFEPIME 2000 MG: 2 INJECTION, POWDER, FOR SOLUTION INTRAVENOUS at 00:43

## 2025-07-09 RX ADMIN — PREDNISONE 40 MG: 20 TABLET ORAL at 15:40

## 2025-07-09 RX ADMIN — METOPROLOL SUCCINATE 25 MG: 25 TABLET, EXTENDED RELEASE ORAL at 10:00

## 2025-07-09 NOTE — NURSING NOTE
Patient arrived to the floor by stretcher accompanied by [facility]. Skin intact. Room air. IV intact. Ambulated on her own from stretcher to bed.

## 2025-07-09 NOTE — CONSULTS
[facility] [place] - [facility]    25, 09:58 CDT  Patient Care Team:  Tai Tom APRN as PCP - General (Gerontology)  Name: Laura Melo  : 1970  MRN: [id number]  Contact Serial Number [id number]    Chief complaint: pneumonia  HPI:  We have been consulted by Ron Elizondo DO to see this 55 y.o. female.  She was transferred to this facility for concern for persistent pneumonia symptoms and need for higher level of care.  She reports no prior history of chronic lung disease.  She feels a little better since admission at the referring hospital.  She has not required oxygen.  She was treated with ceftriaxone and azithromycin.  Telemetry has shown ab 8 beat run of nonsustained vt.   She was on a beach vacation before she became ill. Bronchodilators have been helpful, but racemic albuterol caused jitteriness, and she tolerated levalbuterol better.  Past Medical History:   has a past medical history of Hypertension.   has a past surgical history that includes Cyst Removal; Tubal ligation; and Breast Augmentation.  No Known Allergies  Medications:  cefepime, 2,000 mg, Intravenous, Q8H  doxycycline, 100 mg, Intravenous, Q12H  guaifenesin-dextromethorphan 600-30 mg, 1 tablet, Oral, BID  metoprolol succinate XL, 25 mg, Oral, Daily  sodium chloride, 10 mL, Intravenous, Q12H         Family History:  Negative for infectious lung disease  Social History:   reports that she has never smoked. She has never used smokeless tobacco. She reports that she does not currently use alcohol. She reports that she does not use drugs.  Review of Systems:  Review of Systems   Respiratory:  Positive for wheezing.       Physical Exam:  Temp:  [98 °F (36.7 °C)-98.1 °F (36.7 °C)] 98 °F (36.7 °C)  Heart Rate:  [62-77] 62  Resp:  [16-18] 16  BP: (166-168)/(65-85) 168/85No intake or output data in the 24 hours ending 25 0958      25  0303   Weight: 74.8 kg (165 lb)     SpO2 Percentage     07/08/25 2238 07/09/25 0418   SpO2: 95% 93%     Body mass index is 27.46 kg/m².   Physical Exam  Result Review  Results from last 7 days   Lab Units 07/09/25  0322 07/08/25  2322   WBC 10*3/mm3 18.35* 21.86*   HEMOGLOBIN g/dL 11.1* 11.7*   PLATELETS 10*3/mm3 188 197     Results from last 7 days   Lab Units 07/09/25  0322 07/08/25  2322   SODIUM mmol/L 142 142   POTASSIUM mmol/L 3.7 3.3*   CO2 mmol/L 25.0 24.0   BUN mg/dL 23.1* 23.0*   CREATININE mg/dL 0.79 0.79   MAGNESIUM mg/dL 2.2 2.3   PHOSPHORUS mg/dL 3.4 3.1   GLUCOSE mg/dL 116* 129*         Microbiology Results (last 10 days)       Procedure Component Value - Date/Time    MRSA Screen, PCR (Inpatient) - Swab, Nares [[id number]]  (Normal) Collected: 07/08/25 2308    Lab Status: Final result Specimen: Swab from Nares Updated: 07/09/25 0052     MRSA PCR No MRSA Detected    Narrative:      The negative predictive value of this diagnostic test is high and should only be used to consider de-escalating anti-MRSA therapy. A positive result may indicate colonization with MRSA and must be correlated clinically.    Respiratory Panel PCR w/COVID-19(SARS-CoV-2) [facility]UAN/[facility] In-House, NP Swab in UTM/VTM, 2 HR TAT - Swab, Nasopharynx [[id number]]  (Abnormal) Collected: 07/08/25 2303    Lab Status: Final result Specimen: Swab from Nasopharynx Updated: 07/09/25 0109     ADENOVIRUS, PCR Not Detected     Coronavirus 229E Not Detected     Coronavirus HKU1 Not Detected     Coronavirus NL63 Not Detected     Coronavirus OC43 Not Detected     COVID19 Not Detected     Human Metapneumovirus Detected     Human Rhinovirus/Enterovirus Not Detected     Influenza A PCR Not Detected     Influenza B PCR Not Detected     Parainfluenza Virus 1 Not Detected     Parainfluenza Virus 2 Not Detected     Parainfluenza Virus 3 Not Detected     Parainfluenza Virus 4 Not Detected     RSV, PCR Not Detected     Bordetella pertussis pcr Not Detected     Bordetella parapertussis PCR Not Detected      Chlamydophila pneumoniae PCR Not Detected     Mycoplasma pneumo by PCR Not Detected    Narrative:      In the setting of a positive respiratory panel with a viral infection PLUS a negative procalcitonin without other underlying concern for bacterial infection, consider observing off antibiotics or discontinuation of antibiotics and continue supportive care. If the respiratory panel is positive for atypical bacterial infection (Bordetella pertussis, Chlamydophila pneumoniae, or Mycoplasma pneumoniae), consider antibiotic de-escalation to target atypical bacterial infection.          Recent radiology:   Imaging Results (Last 72 Hours)       Procedure Component Value Units Date/Time    US Liver - In process [[id number]] Resulted: 07/09/25 0816     Updated: 07/09/25 0850    This result has not been signed. Information might be incomplete.      XR Chest 1 View [[id number]] Collected: 07/09/25 0607     Updated: 07/09/25 0611    Narrative:      EXAMINATION: XR CHEST 1 VW-        HISTORY: SOB     A frontal projection of the chest is obtained. There is no previous  study for comparison.     The lungs are well-expanded.     A few granulomas are seen in the lung     Particularly the left upper lung.     There is no recent infiltrate. There is no pleural effusion, pulmonary  congestion or pneumothorax.     The heart is in the normal range.     There is no acute bony abnormality.       Impression:      1. No active cardiopulmonary disease.        This report was signed and finalized on 7/9/2025 6:08 AM by Dr. Kirk Kraus MD.             Personal review of imaging : CXR shows no bisible infiltrates to correlate with infiltrates described on ct from referring facility  Independent review of ekg: nonspecific stt abn, borderline qt interval prolongation  Problem List as identified by Epic (may contain historical, inactive problems)    Pneumonia    Infection due to human metapneumovirus (hMPV)    Hypokalemia    Elevated  LFTs    Pulmonary Assessment:    Human metapneumovirus pneumonia  Wheezing.  Might just relate to viral illness, but also viral illness may have exposed an underlying obstructive condition such as asthma  Ventricular ectopy    Recommend/plan:   Discussed case with Dr. King  Continue current supportive care  Reasonable to continue current abx, but I would not escalate further  Hold macrolide and any other qt prolonging medication as much as possible given ventricular ectopy  Continue bronchodilators as tolerated, use levalbuterol as needed    Thank you for this consult.  We will follow along.  Electronically signed by Jovi Reyes MD, 07/09/25, 9:58 AM CDT.

## 2025-07-09 NOTE — PLAN OF CARE
Problem: Adult Inpatient Plan of Care  Goal: Plan of Care Review  Outcome: Progressing  Goal: Patient-Specific Goal (Individualized)  Outcome: Progressing  Goal: Absence of Hospital-Acquired Illness or Injury  Outcome: Progressing  Intervention: Identify and Manage Fall Risk  Recent Flowsheet Documentation  Taken 7/9/2025 1800 by Megan Hancock RN  Safety Promotion/Fall Prevention: safety round/check completed  Taken 7/9/2025 1700 by Megan Hancock RN  Safety Promotion/Fall Prevention: safety round/check completed  Taken 7/9/2025 1600 by Megan Hancock RN  Safety Promotion/Fall Prevention: safety round/check completed  Taken 7/9/2025 1500 by Megan Hancock RN  Safety Promotion/Fall Prevention: safety round/check completed  Taken 7/9/2025 1400 by Megan Hancock RN  Safety Promotion/Fall Prevention: safety round/check completed  Taken 7/9/2025 1300 by Megan Hancock RN  Safety Promotion/Fall Prevention: safety round/check completed  Taken 7/9/2025 1200 by Megan Hancock RN  Safety Promotion/Fall Prevention: safety round/check completed  Taken 7/9/2025 1100 by Megan Hancock RN  Safety Promotion/Fall Prevention: safety round/check completed  Taken 7/9/2025 1000 by Megan Hancock RN  Safety Promotion/Fall Prevention: safety round/check completed  Taken 7/9/2025 0900 by Megan Hancock RN  Safety Promotion/Fall Prevention: safety round/check completed  Taken 7/9/2025 0800 by Megan Hancock RN  Safety Promotion/Fall Prevention: safety round/check completed  Taken 7/9/2025 0700 by Megan Hancock RN  Safety Promotion/Fall Prevention: safety round/check completed  Intervention: Prevent Skin Injury  Recent Flowsheet Documentation  Taken 7/9/2025 1800 by Megan Hancock RN  Body Position: position changed independently  Taken 7/9/2025 1700 by Megan Hancock RN  Body Position: position changed independently  Taken 7/9/2025 1600 by Megan Hancock RN  Body Position:  position changed independently  Taken 7/9/2025 1500 by Megan Hancock RN  Body Position: position changed independently  Taken 7/9/2025 1400 by Megan Hancock RN  Body Position: position changed independently  Taken 7/9/2025 1300 by Megan Hancock RN  Body Position: position changed independently  Taken 7/9/2025 1200 by Megan Hancock RN  Body Position: position changed independently  Taken 7/9/2025 1100 by Megan Hancock RN  Body Position: position changed independently  Taken 7/9/2025 1000 by Megan Hancock RN  Body Position: position changed independently  Taken 7/9/2025 0900 by Megan Hancock RN  Body Position: position changed independently  Taken 7/9/2025 0800 by Megan Hancock RN  Body Position: position changed independently  Taken 7/9/2025 0700 by Megan Hancock RN  Body Position: position changed independently  Intervention: Prevent Infection  Recent Flowsheet Documentation  Taken 7/9/2025 1800 by Megan Hancock RN  Infection Prevention: hand hygiene promoted  Taken 7/9/2025 1700 by Megan Hancock RN  Infection Prevention: hand hygiene promoted  Taken 7/9/2025 1600 by Megan Hancock RN  Infection Prevention: hand hygiene promoted  Taken 7/9/2025 1500 by Megan Hancock RN  Infection Prevention: hand hygiene promoted  Taken 7/9/2025 1400 by Megan Hancock RN  Infection Prevention: hand hygiene promoted  Taken 7/9/2025 1300 by Megan Hancock RN  Infection Prevention: hand hygiene promoted  Taken 7/9/2025 1200 by eMgan Hancock RN  Infection Prevention: hand hygiene promoted  Taken 7/9/2025 1100 by Megan Hancock RN  Infection Prevention: hand hygiene promoted  Taken 7/9/2025 1000 by Megan Hancock RN  Infection Prevention: hand hygiene promoted  Taken 7/9/2025 0900 by Megan Hancock RN  Infection Prevention: hand hygiene promoted  Taken 7/9/2025 0700 by Megan Hancock RN  Infection Prevention: hand hygiene promoted  Goal: Optimal  Comfort and Wellbeing  Outcome: Progressing  Goal: Readiness for Transition of Care  Outcome: Progressing     Problem: Comorbidity Management  Goal: Blood Pressure in Desired Range  Outcome: Progressing   Goal Outcome Evaluation:         Patient states she feels better today. Should go home tomorrow. Will continue monitor.

## 2025-07-09 NOTE — PROGRESS NOTES
[facility] Clinical Pharmacy Services: Cefepime Consult  Laura Melo is a 55 y.o. female who has been consulted to dose Cefepime for Pneumonia.    Current Antimicrobial Therapy:  cefepime 2000 mg IVPB in 100 mL NS (MBP)  doxycycline (VIBRAMYCIN) 100 mg IVPB in 100 mL NS (MBP)    Relevant clinical data and objective history reviewed:  Wt: 74.8 kg (165 lb); BMI: Body mass index is 27.46 kg/m².    Temp Readings from Last 1 Encounters:   07/08/25 98.1 °F (36.7 °C) (Oral)        Estimated Creatinine Clearance: 79.5 mL/min (by C-G formula based on SCr of 0.79 mg/dL).    Results from last 7 days   Lab Units 07/08/25  2322   CREATININE mg/dL 0.79   WBC 10*3/mm3 21.86*   PROCALCITONIN ng/mL 0.03       Microbiology Culture Results:        Assessment/Plan:  Initiate Cefepime 2000 mg IV extended infusion every 8 hours  Pharmacy will continue to monitor renal function, clinical status and culture results and adjust the dose and/or frequency as needed.    Arnaud Schwartz, PharmD  7/9/2025  00:32 CDT

## 2025-07-09 NOTE — PAYOR COMM NOTE
"7/9/25 [facility] [phone]  [phone]    ER ADMIT TO INPATIENT ON 7/8/25 FAXING FOR INPATIENT REVIEW.            Laura Melo (55 y.o. Female)       Date of Birth   1970    Social Security Number       Address   [address] [place] [address]    Home Phone   [phone]    MRN   [id number]       Religious   [facility]    Marital Status                               Admission Date   7/8/2025    Admission Type   Urgent    Admitting Provider   Ron Elizondo DO    Attending Provider   Ron Elizondo DO    Department, Room/Bed   [address], 447/1       Discharge Date       Discharge Disposition       Discharge Destination                                 Attending Provider: Ron Elizondo DO    Allergies: No Known Allergies    Isolation: Contact   Infection: Human Metapneumovirus  (07/09/25)   Code Status: CPR    Ht: 165.1 cm (65\")   Wt: 74.8 kg (165 lb)    Admission Cmt: None   Principal Problem: Pneumonia [J18.9]                   Active Insurance as of 7/8/2025       Primary Coverage       Payor Plan Insurance Group Employer/Plan Group    [employer]HEM BLUE CROSS ANTHEM BLUE CROSS BLUE SHIELD PPO [id number]       Payor Plan Address Payor Plan Phone Number Payor Plan Fax Number Effective Dates    [address] [phone]  12/1/2020 - None Entered    [place] [address]         Subscriber Name Subscriber Birth Date Member ID       LAURA MELO 1970 [id number]                     Emergency Contacts        (Rel.) Home Phone Work Phone Mobile Phone    DAMIÁN MELO (Spouse) [phone] -- --             [facility] Encounter Date/Time: 7/8/2025 2241   Hospital Account: [id number]    MRN: [id number]   Patient:  Larua Melo   Contact Serial #: [id number]   SSN:          ENCOUNTER             Patient Class: Inpatient   Unit: [address] Service: Medicine     Bed: 447/1   Admitting " Provider: Ron Elizondo DO   Referring Physician: Ronnie Villegas   Attending Provider: Ron Elizondo DO   Adm Diagnosis: Pneumonia [J18.9]               PATIENT             Name: Laura Aguilar : 1970 (55 yrs)   Address: [address] [place] [address] Sex: Female   City: [place] [address]   County: [place]   Marital Status:  Ethnicity: NOT        Race: WHITE   Primary Care Provider: Tai Tom APRN Patients Phone: Home Phone: [phone]           EMERGENCY CONTACT   Contact Name Legal Guardian? Relationship to Patient Home Phone Work Phone Mobile Phone   1. DAMIÁN AGUILAR  2. *No Contact Specified*      Spouse    [phone]                GUARANTOR             Guarantor: Laura Aguilar     : 1970   Address: [address] [place] [address] Sex: [place] [address]     Relation to Patient: Self       Home Phone: [phone]   Guarantor ID: [id number]       Work Phone:     GUARANTOR EMPLOYER   Employer: [employer]         Status: FULL TIME   COVERAGE          PRIMARY INSURANCE   Payor: ANTHEM BLUE CROSS Plan: ANTHEM BLUE CROSS BLUE SHIELD PPO   Group Number: [id number] Insurance Type: INDEMNITY   Subscriber Name: LAURA AGUILAR Subscriber : 1970   Subscriber ID: [id number] Coverage Address: [place] [address]   Pat. Rel. to Subscriber: Self Coverage Phone: [phone]   SECONDARY INSURANCE   Payor: N/A Plan: N/A   Group Number:   Insurance Type:     Subscriber Name:   Subscriber :     Subscriber ID:   Coverage Address:     Pat. Rel. to Subscriber:   Coverage Phone:        Contact Serial # ([id number])         2025    Chart ID ([id number])        Anastasia Howell MD   Physician  Specialty: Hospitalist     H&P      Addendum     Date of Service: 25  Creation Time: 25     Expand All Collapse All         [facility] Medicine Services  HISTORY AND PHYSICAL     Date of  Admission: 7/8/2025  Primary Care Physician: Tai Tom, APRN     Subjective   Primary Historian: Patient     Chief Complaint: Transferred from another hospital for pneumonia     History of Present Illness  Ms. Melo is a 55-year-old female patient with a medical history of hypertension, being admitted to our hospital for the evaluation of pneumonia.  As reported, she was admitted at [facility] and treated  for pneumonia with ceftriaxone and azithromycin.  She also received some steroids and nebulizers.  Patient's lactic acid went up from 2.2 to 4.4, and the medical team was concerned that the patient would require pulmonary consultation.  Medical team contacted transfer center, and hospitalist, Dr Asher, accepted.     Patient received during night shift to the floor, was complaining of cough, but denies any chest pain, shortness of breath.  No fever or chills.  She reports that she feels better than when she first had the pneumonia.           Review of Systems   Otherwise complete ROS reviewed and negative except as mentioned in the HPI.     Past Medical History:   Medical History[]Expand by Default        Past Medical History:   Diagnosis Date    Hypertension           Past Surgical History:  Surgical History         Past Surgical History:   Procedure Laterality Date    BREAST AUGMENTATION        CYST REMOVAL         left neck    TUBAL ABDOMINAL LIGATION             Social History:  reports that she has never smoked. She has never used smokeless tobacco. She reports that she does not currently use alcohol. She reports that she does not use drugs.     Family History: family history is not on file.        Allergies:  Allergies   No Known Allergies        Medications:          Prior to Admission medications    Medication Sig Start Date End Date Taking? Authorizing Provider   aspirin 81 MG EC tablet Take 81 mg by mouth 2 (two) times a day.       Provider, Historical, MD   atorvastatin (LIPITOR) 20  "MG tablet TAKE 1 TABLET BY MOUTH ONCE A DAY FOR CHOLESTEROL 3/23/21     Angela Rodriguez MD   cetirizine-pseudoephedrine (ZyrTEC-D) 5-120 MG per 12 hr tablet   3/29/21     Angela Rodriguez MD   hydroCHLOROthiazide (MICROZIDE) 12.5 MG capsule Take 1 capsule by mouth Every Morning. 4/21/21     Cliff Zamora Jr., MD   meclizine (ANTIVERT) 25 MG tablet Take 25 mg by mouth Every 6 (Six) Hours As Needed. 2/22/21     Angela Rodriguez MD   metoprolol succinate XL (TOPROL-XL) 50 MG 24 hr tablet Take 50 mg by mouth Daily.       Angela Rodriguez MD   ondansetron (ZOFRAN) 4 MG tablet Take 4 mg by mouth Every 6 (Six) Hours As Needed. 2/22/21     Angela Rodriguez MD   predniSONE (DELTASONE) 20 MG tablet   3/29/21     Angela Rodriguez MD      I have utilized all available immediate resources to obtain, update, or review the patient's current medications (including all prescriptions, over-the-counter products, herbals, cannabis/cannabidiol products, and vitamin/mineral/dietary (nutritional) supplements).     Objective      Vital Signs: /85 (BP Location: Left arm, Patient Position: Lying)   Pulse 62   Temp 98 °F (36.7 °C) (Oral)   Resp 16   Ht 165.1 cm (65\")   Wt 74.8 kg (165 lb)   LMP 01/01/2014   SpO2 93%   BMI 27.46 kg/m²   Physical Exam  Constitutional:       Appearance: She is ill-appearing.   Cardiovascular:      Rate and Rhythm: Normal rate and regular rhythm.      Pulses: Normal pulses.      Heart sounds: Normal heart sounds. No murmur heard.  Pulmonary:      Effort: Pulmonary effort is normal. No respiratory distress.      Breath sounds: Normal breath sounds. No wheezing or rales.   Abdominal:      General: Bowel sounds are normal. There is no distension.      Palpations: Abdomen is soft.      Tenderness: There is no abdominal tenderness. There is no guarding.   Musculoskeletal:      Right lower leg: No edema.      Left lower leg: No edema.   Skin:     General: Skin is " warm.   Neurological:      Mental Status: She is alert and oriented to person, place, and time. Mental status is at baseline.                  Results Reviewed:  Lab Results (last 24 hours)         Procedure Component Value Units Date/Time     Comprehensive Metabolic Panel [[id number]]  (Abnormal) Collected: 07/09/25 0322     Specimen: Blood Updated: 07/09/25 0416       Glucose 116 mg/dL         BUN 23.1 mg/dL         Creatinine 0.79 mg/dL         Sodium 142 mmol/L         Potassium 3.7 mmol/L         Chloride 105 mmol/L         CO2 25.0 mmol/L         Calcium 8.7 mg/dL         Total Protein 6.6 g/dL         Albumin 3.7 g/dL         ALT (SGPT) 111 U/L         AST (SGOT) 44 U/L         Alkaline Phosphatase 132 U/L         Total Bilirubin 0.4 mg/dL         Globulin 2.9 gm/dL         A/G Ratio 1.3 g/dL         BUN/Creatinine Ratio 29.2       Anion Gap 12.0 mmol/L         eGFR 87.4 mL/min/1.73       Narrative:       GFR Categories in Chronic Kidney Disease (CKD)              GFR Category          GFR (mL/min/1.73)    Interpretation  G1                    90 or greater        Normal or high (1)  G2                    60-89                Mild decrease (1)  G3a                   45-59                Mild to moderate decrease  G3b                   30-44                Moderate to severe decrease  G4                    15-29                Severe decrease  G5                    14 or less           Kidney failure     (1)In the absence of evidence of kidney disease, neither GFR category G1 or G2 fulfill the criteria for CKD.     eGFR calculation 2021 CKD-EPI creatinine equation, which does not include race as a factor     Magnesium [[id number]]  (Normal) Collected: 07/09/25 0322     Specimen: Blood Updated: 07/09/25 0416       Magnesium 2.2 mg/dL       Phosphorus [[id number]]  (Normal) Collected: 07/09/25 0322     Specimen: Blood Updated: 07/09/25 0416       Phosphorus 3.4 mg/dL       CBC (No Diff) [[id number]]  (Abnormal)  Collected: 07/09/25 0322     Specimen: Blood Updated: 07/09/25 0352       WBC 18.35 10*3/mm3         RBC 3.81 10*6/mm3         Hemoglobin 11.1 g/dL         Hematocrit 33.5 %         MCV 87.9 fL         MCH 29.1 pg         MCHC 33.1 g/dL         RDW 13.8 %         RDW-SD 44.4 fl         MPV 10.5 fL         Platelets 188 10*3/mm3       Respiratory Panel PCR w/COVID-19(SARS-CoV-2) UMA/ANISHA/JUAN/PAD/COR/RON In-House, NP Swab in UTM/VTM, 2 HR TAT - Swab, Nasopharynx [[id number]]  (Abnormal) Collected: 07/08/25 2303     Specimen: Swab from Nasopharynx Updated: 07/09/25 0109       ADENOVIRUS, PCR Not Detected       Coronavirus 229E Not Detected       Coronavirus HKU1 Not Detected       Coronavirus NL63 Not Detected       Coronavirus OC43 Not Detected       COVID19 Not Detected       Human Metapneumovirus Detected       Human Rhinovirus/Enterovirus Not Detected       Influenza A PCR Not Detected       Influenza B PCR Not Detected       Parainfluenza Virus 1 Not Detected       Parainfluenza Virus 2 Not Detected       Parainfluenza Virus 3 Not Detected       Parainfluenza Virus 4 Not Detected       RSV, PCR Not Detected       Bordetella pertussis pcr Not Detected       Bordetella parapertussis PCR Not Detected       Chlamydophila pneumoniae PCR Not Detected       Mycoplasma pneumo by PCR Not Detected     Narrative:       In the setting of a positive respiratory panel with a viral infection PLUS a negative procalcitonin without other underlying concern for bacterial infection, consider observing off antibiotics or discontinuation of antibiotics and continue supportive care. If the respiratory panel is positive for atypical bacterial infection (Bordetella pertussis, Chlamydophila pneumoniae, or Mycoplasma pneumoniae), consider antibiotic de-escalation to target atypical bacterial infection.     MRSA Screen, PCR (Inpatient) - Swab, Nares [[id number]]  (Normal) Collected: 07/08/25 2308     Specimen: Swab from Nares Updated: 07/09/25  "0052       MRSA PCR No MRSA Detected     Narrative:       The negative predictive value of this diagnostic test is high and should only be used to consider de-escalating anti-MRSA therapy. A positive result may indicate colonization with MRSA and must be correlated clinically.     Procalcitonin [[id number]]  (Normal) Collected: 07/08/25 2322     Specimen: Blood Updated: 07/09/25 0001       Procalcitonin 0.03 ng/mL       Narrative:       As a Marker for Sepsis (Non-Neonates):     1. <0.5 ng/mL represents a low risk of severe sepsis and/or septic shock.  2. >2 ng/mL represents a high risk of severe sepsis and/or septic shock.     As a Marker for Lower Respiratory Tract Infections that require antibiotic therapy:     PCT on Admission    Antibiotic Therapy       6-12 Hrs later     >0.5                Strongly Recommended  >0.25 - <0.5        Recommended   0.1 - 0.25          Discouraged              Remeasure/reassess PCT  <0.1                Strongly Discouraged     Remeasure/reassess PCT     As 28 day mortality risk marker: \"Change in Procalcitonin Result\" (>80% or <=80%) if Day 0 (or Day 1) and Day 4 values are available. Refer to http://www.[facility]-pct-calculator.com     Change in PCT <=80%  A decrease of PCT levels below or equal to 80% defines a positive change in PCT test result representing a higher risk for 28-day all-cause mortality of patients diagnosed with severe sepsis for septic shock.     Change in PCT >80%  A decrease of PCT levels of more than 80% defines a negative change in PCT result representing a lower risk for 28-day all-cause mortality of patients diagnosed with severe sepsis or septic shock.         C-reactive Protein [[id number]]  (Normal) Collected: 07/08/25 2322     Specimen: Blood Updated: 07/08/25 2357       C-Reactive Protein <0.30 mg/dL       Comprehensive Metabolic Panel [[id number]]  (Abnormal) Collected: 07/08/25 2322     Specimen: Blood Updated: 07/08/25 2355       Glucose 129 mg/dL      "    BUN 23.0 mg/dL         Creatinine 0.79 mg/dL         Sodium 142 mmol/L         Potassium 3.3 mmol/L         Chloride 104 mmol/L         CO2 24.0 mmol/L         Calcium 9.1 mg/dL         Total Protein 7.2 g/dL         Albumin 4.0 g/dL         ALT (SGPT) 129 U/L         AST (SGOT) 50 U/L         Alkaline Phosphatase 145 U/L         Total Bilirubin 0.4 mg/dL         Globulin 3.2 gm/dL         A/G Ratio 1.3 g/dL         BUN/Creatinine Ratio 29.1       Anion Gap 14.0 mmol/L         eGFR 87.4 mL/min/1.73       Narrative:       GFR Categories in Chronic Kidney Disease (CKD)              GFR Category          GFR (mL/min/1.73)    Interpretation  G1                    90 or greater        Normal or high (1)  G2                    60-89                Mild decrease (1)  G3a                   45-59                Mild to moderate decrease  G3b                   30-44                Moderate to severe decrease  G4                    15-29                Severe decrease  G5                    14 or less           Kidney failure     (1)In the absence of evidence of kidney disease, neither GFR category G1 or G2 fulfill the criteria for CKD.     eGFR calculation 2021 CKD-EPI creatinine equation, which does not include race as a factor     Magnesium [[id number]]  (Normal) Collected: 07/08/25 2322     Specimen: Blood Updated: 07/08/25 2355       Magnesium 2.3 mg/dL       Phosphorus [[id number]]  (Normal) Collected: 07/08/25 2322     Specimen: Blood Updated: 07/08/25 2352       Phosphorus 3.1 mg/dL       Lactic Acid, Plasma [[id number]]  (Normal) Collected: 07/08/25 2322     Specimen: Blood Updated: 07/08/25 2351       Lactate 2.0 mmol/L       aPTT [[id number]]  (Abnormal) Collected: 07/08/25 2322     Specimen: Blood Updated: 07/08/25 2342       PTT 21.4 seconds       Narrative:       PTT = The equivalent PTT values for the therapeutic range of heparin levels at 0.3 to 0.7 U/ml are 77 - 99 seconds.     Protime-INR [[id number]]  (Normal)  Collected: 07/08/25 2322     Specimen: Blood Updated: 07/08/25 2342       Protime 14.3 Seconds         INR 1.05     CBC & Differential [[id number]]  (Abnormal) Collected: 07/08/25 2322     Specimen: Blood Updated: 07/08/25 2333     Narrative:       The following orders were created for panel order CBC & Differential.  Procedure                               Abnormality         Status                     ---------                               -----------         ------                     CBC Auto Differential[[id number]]        Abnormal            Final result                  Please view results for these tests on the individual orders.     CBC Auto Differential [[id number]]  (Abnormal) Collected: 07/08/25 2322     Specimen: Blood Updated: 07/08/25 2333       WBC 21.86 10*3/mm3         RBC 3.93 10*6/mm3         Hemoglobin 11.7 g/dL         Hematocrit 34.1 %         MCV 86.8 fL         MCH 29.8 pg         MCHC 34.3 g/dL         RDW 13.8 %         RDW-SD 44.2 fl         MPV 10.3 fL         Platelets 197 10*3/mm3         Neutrophil % 77.8 %         Lymphocyte % 9.7 %         Monocyte % 8.0 %         Eosinophil % 0.0 %         Basophil % 0.4 %         Immature Grans % 4.1 %         Neutrophils, Absolute 17.01 10*3/mm3         Lymphocytes, Absolute 2.13 10*3/mm3         Monocytes, Absolute 1.74 10*3/mm3         Eosinophils, Absolute 0.00 10*3/mm3         Basophils, Absolute 0.09 10*3/mm3         Immature Grans, Absolute 0.89 10*3/mm3         nRBC 0.0 /100 WBC       Blood Culture - Blood, Arm, Left [[id number]] Collected: 07/08/25 2322     Specimen: Blood from Arm, Left Updated: 07/08/25 2331     Blood Culture - Blood, Arm, Right [[id number]] Collected: 07/08/25 2322     Specimen: Blood from Arm, Right Updated: 07/08/25 2330             Imaging Results (Last 24 Hours)         Procedure Component Value Units Date/Time     XR Chest 1 View [[id number]] Collected: 07/09/25 0607       Updated: 07/09/25 0611     Narrative:        EXAMINATION: XR CHEST 1 VW-        HISTORY: SOB     A frontal projection of the chest is obtained. There is no previous  study for comparison.     The lungs are well-expanded.     A few granulomas are seen in the lung     Particularly the left upper lung.     There is no recent infiltrate. There is no pleural effusion, pulmonary  congestion or pneumothorax.     The heart is in the normal range.     There is no acute bony abnormality.        Impression:       1. No active cardiopulmonary disease.        This report was signed and finalized on 7/9/2025 6:08 AM by Dr. Kirk Kraus MD.                I have personally reviewed and interpreted the radiology studies and ECG obtained at time of admission.      Assessment / Plan   Assessment:        Active Hospital Problems     Diagnosis      **Pneumonia      Infection due to human metapneumovirus (hMPV)      Hypokalemia      Elevated LFTs           Treatment Plan  The patient will be admitted to my service here at [facility].      Assessment and plan:  #r/o Pneumonia (Left lung infiltrates per CT scan outside)  #Human metapneumovirus infection  #Hypokalemia.  #Elevated LFTs     - Admitting to floor.  - Consulting with pulmonary.  - Patient was receiving Rocephin and azithromycin at the other hospital.  Will escalate antibiotics to cover with cefepime and doxycycline for now.  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  Concern for healthcare associated pneumonia.  Day team may consider de-escalation if they find appropriate.  - DVT prophylaxis with SCDs.  - Ultrasound liver ordered.  To note that CT scan done at the other hospital, showing thickening of the gallbladder wall, and gallstones, and radiologist suggesting performing an ultrasound.  I do not see that ultrasound was done in the other facility among documents received, so I will order an ultrasound liver to look at both liver and gallbladder.  Day team to  follow. Somehow, re-assured that Pt denies abd pain, no RUQ tenderness on exam, and negative tremaine sign.  - Will replace potassium and will recheck levels in the morning.  - Keeping an p.o. for the ultrasound.  - Nebulizers as needed.        Medical Decision Making  Number and Complexity of problems: 4 acute and moderate  Differential Diagnosis: As above     Conditions and Status        Condition is worsening.     MDM Data  External documents reviewed: Yes  Cardiac tracing (EKG, telemetry) interpretation: Ordered  Radiology interpretation: Yes  Labs reviewed: Yes  Any tests that were considered but not ordered: No     Decision rules/scores evaluated (example [id number], Wells, etc): No     Discussed with: Discussed the plan with the patient and family at bedside     Care Planning  Shared decision making: Discussed the plan with the patient and she was agreeable  Code status and discussions: Full code     Disposition  Social Determinants of Health that impact treatment or disposition: Not at the moment  Estimated length of stay is 2-3.      I confirmed that the patient's advanced care plan is present, code status is documented, and a surrogate decision maker is listed in the patient's medical record.         The patient was seen and examined by me on 7/8 at 11 PM.     Electronically signed by Anastasia Howell MD, 07/09/25, 06:19 CDT.                         Arnaud Schwartz, PharmD   Pharmacist  Pharmacy     Progress Notes      Signed     Date of Service: 07/09/25 0033  Creation Time: 07/09/25 0033     Signed         [facility] Clinical Pharmacy Services: Cefepime Consult  Laura Melo is a 55 y.o. female who has been consulted to dose Cefepime for Pneumonia.     Current Antimicrobial Therapy:  cefepime 2000 mg IVPB in 100 mL NS (MBP)  doxycycline (VIBRAMYCIN) 100 mg IVPB in 100 mL NS (MBP)     Relevant clinical data and objective history reviewed:  Wt: 74.8 kg (165 lb); BMI: Body mass index is 27.46  kg/m².         Temp Readings from Last 1 Encounters:   25 98.1 °F (36.7 °C) (Oral)         Estimated Creatinine Clearance: 79.5 mL/min (by C-G formula based on SCr of 0.79 mg/dL).                   Everette Mantilla, RN   Registered Nurse  Nursing     Plan of Care      Signed     Date of Service: 25  Creation Time: 25     Signed         Goal Outcome Evaluation:  Plan of Care Reviewed With: patient  Progress: improving  Outcome Evaluation: S64-74. 8 beat run of v tach; AAOx4. Able to make needs known. Room air. IV intact and patent. No SOB. Denies pain. Bed low an locked with call light in reach. Isolation maintained. Spouse bedside.                       Jovi Reyes MD   Physician  Pulmonology     Consults      Incomplete     Date of Service: 25  Creation Time: 25     Incomplete       Expand All Collapse All            [facility] [place] [place] [facility]     25, 09:58 CDT  Patient Care Team:  Tai Tom APRN as PCP - General (Gerontology)  Name: Laura Mcdaniel Kameron  : 1970  MRN: [id number]  Contact Serial Number [id number]       HPI:  We have been consulted by Ron Elizondo DO to see this 55 y.o. female.    Past Medical History:   has a past medical history of Hypertension.   has a past surgical history that includes Cyst Removal; Tubal ligation; and Breast Augmentation.  Allergies   No Known Allergies     Medications:    Scheduled IV Meds with Route[]Expand by Default   cefepime, 2,000 mg, Intravenous, Q8H  doxycycline, 100 mg, Intravenous, Q12H  guaifenesin-dextromethorphan 600-30 mg, 1 tablet, Oral, BID  metoprolol succinate XL, 25 mg, Oral, Daily  sodium chloride, 10 mL, Intravenous, Q12H         Infusion Medications         Family History:  History reviewed. No pertinent family history.  Social History:   reports that she has never smoked. She has never used smokeless tobacco. She reports that she does not currently use  alcohol. She reports that she does not use drugs.  Review of Systems:  Review of Systems   Physical Exam:  Temp:  [98 °F (36.7 °C)-98.1 °F (36.7 °C)] 98 °F (36.7 °C)  Heart Rate:  [62-77] 62  Resp:  [16-18] 16  BP: (166-168)/(65-85) 168/85No intake or output data in the 24 hours ending 07/09/25 0958  Vitals             07/08/25 2238   Weight: 74.8 kg (165 lb)              SpO2 Percentage     07/08/25 2238 07/09/25 0418   SpO2: 95% 93%      Body mass index is 27.46 kg/m².   Physical Exam  Result Review        Results from last 7 days   Lab Units 07/09/25  0322 07/08/25  2322   WBC 10*3/mm3 18.35* 21.86*   HEMOGLOBIN g/dL 11.1* 11.7*   PLATELETS 10*3/mm3 188 197            Results from last 7 days   Lab Units 07/09/25  0322 07/08/25  2322   SODIUM mmol/L 142 142   POTASSIUM mmol/L 3.7 3.3*   CO2 mmol/L 25.0 24.0   BUN mg/dL 23.1* 23.0*   CREATININE mg/dL 0.79 0.79   MAGNESIUM mg/dL 2.2 2.3   PHOSPHORUS mg/dL 3.4 3.1   GLUCOSE mg/dL 116* 129*          Microbiology Results (last 10 days)         Procedure Component Value - Date/Time     MRSA Screen, PCR (Inpatient) - Swab, Nares [[id number]]  (Normal) Collected: 07/08/25 2308     Lab Status: Final result Specimen: Swab from Nares Updated: 07/09/25 0052       MRSA PCR No MRSA Detected     Narrative:       The negative predictive value of this diagnostic test is high and should only be used to consider de-escalating anti-MRSA therapy. A positive result may indicate colonization with MRSA and must be correlated clinically.     Respiratory Panel PCR w/COVID-19(SARS-CoV-2) [facility] In-House, NP Swab in UTM/VTM, 2 HR TAT - Swab, Nasopharynx [[id number]]  (Abnormal) Collected: 07/08/25 2303     Lab Status: Final result Specimen: Swab from Nasopharynx Updated: 07/09/25 0109       ADENOVIRUS, PCR Not Detected       Coronavirus 229E Not Detected       Coronavirus HKU1 Not Detected       Coronavirus NL63 Not Detected       Coronavirus OC43 Not Detected       COVID19  Not Detected       Human Metapneumovirus Detected       Human Rhinovirus/Enterovirus Not Detected       Influenza A PCR Not Detected       Influenza B PCR Not Detected       Parainfluenza Virus 1 Not Detected       Parainfluenza Virus 2 Not Detected       Parainfluenza Virus 3 Not Detected       Parainfluenza Virus 4 Not Detected       RSV, PCR Not Detected       Bordetella pertussis pcr Not Detected       Bordetella parapertussis PCR Not Detected       Chlamydophila pneumoniae PCR Not Detected       Mycoplasma pneumo by PCR Not Detected     Narrative:       In the setting of a positive respiratory panel with a viral infection PLUS a negative procalcitonin without other underlying concern for bacterial infection, consider observing off antibiotics or discontinuation of antibiotics and continue supportive care. If the respiratory panel is positive for atypical bacterial infection (Bordetella pertussis, Chlamydophila pneumoniae, or Mycoplasma pneumoniae), consider antibiotic de-escalation to target atypical bacterial infection.             Recent radiology:   Imaging Results (Last 72 Hours)         Procedure Component Value Units Date/Time      Liver - In process [[id number]] Resulted: 07/09/25 0816       Updated: 07/09/25 0850     This result has not been signed. Information might be incomplete.       XR Chest 1 View [[id number]] Collected: 07/09/25 0607       Updated: 07/09/25 0611     Narrative:       EXAMINATION: XR CHEST 1 VW-        HISTORY: SOB     A frontal projection of the chest is obtained. There is no previous  study for comparison.     The lungs are well-expanded.     A few granulomas are seen in the lung     Particularly the left upper lung.     There is no recent infiltrate. There is no pleural effusion, pulmonary  congestion or pneumothorax.     The heart is in the normal range.     There is no acute bony abnormality.        Impression:       1. No active cardiopulmonary disease.        This report was  signed and finalized on 7/9/2025 6:08 AM by Dr. Kirk Kraus MD.                  Other test results (not lab or imaging):    Independent review of ekg:   Problem List as identified by Epic (may contain historical, inactive problems)    Pneumonia    Infection due to human metapneumovirus (hMPV)    Hypokalemia    Elevated LFTs     Pulmonary Assessment:     Human metapneumovirus pneumonia  Wheezing.  Might just relate to viral illness, but also viral illness may have exposed an underlying obstructive condition such as asthma  Ventricular ectopy     Recommend/plan:   Discussed case with Dr. King  Continue current supportive care  Reasonable to continue current abx, but I would not escalate further  Hold macrolide and any other qt prolonging medication as much as possible given ventricular ectopy  Continue bronchodilators as tolerated     Thank you for this consult.  We will follow along.  Electronically signed by Jovi Reyes MD, 07/09/25, 9:58 AM CDT.                      Ron King DO   Physician  Hospitalist     Progress Notes      Incomplete     Date of Service: 07/09/25 1044  Creation Time: 07/09/25 1044     Incomplete              [facility]  INPATIENT PROGRESS NOTE     Patient Name: Laura Melo  Date of Admission: 7/8/2025  Today's Date: 07/09/25  Length of Stay: 1  Primary Care Physician: Tai Tom, [place]     Subjective                Review of Systems   All pertinent negatives and positives are as above. All other systems have been reviewed and are negative unless otherwise stated.      Objective    Temp:  [98 °F (36.7 °C)-98.1 °F (36.7 °C)] 98 °F (36.7 °C)  Heart Rate:  [62-77] 62  Resp:  [16-18] 16  BP: (166-168)/(65-85) 168/85            Results Review:  I have reviewed the labs, radiology results, and diagnostic studies.     Laboratory Data:         Results from last 7 days   Lab Units 07/09/25  0322 07/08/25  2322   WBC  "10*3/mm3 18.35* 21.86*   HEMOGLOBIN g/dL 11.1* 11.7*   HEMATOCRIT % 33.5* 34.1   PLATELETS 10*3/mm3 188 197               Results from last 7 days   Lab Units 07/09/25  0322 07/08/25  2322   SODIUM mmol/L 142 142   POTASSIUM mmol/L 3.7 3.3*   CHLORIDE mmol/L 105 104   CO2 mmol/L 25.0 24.0   BUN mg/dL 23.1* 23.0*   CREATININE mg/dL 0.79 0.79   CALCIUM mg/dL 8.7 9.1   BILIRUBIN mg/dL 0.4 0.4   ALK PHOS U/L 132* 145*   ALT (SGPT) U/L 111* 129*   AST (SGOT) U/L 44* 50*   GLUCOSE mg/dL 116* 129*         Culture Data:   No results found for: \"BLOODCX\", \"URINECX\", \"WOUNDCX\", \"MRSACX\", \"RESPCX\", \"STOOLCX\"     Radiology Data:   Imaging Results (Last 24 Hours)         Procedure Component Value Units Date/Time      Liver - In process [[id number]] Resulted: 07/09/25 0816       Updated: 07/09/25 0850     This result has not been signed. Information might be incomplete.       XR Chest 1 View [[id number]] Collected: 07/09/25 0607       Updated: 07/09/25 0611     Narrative:       EXAMINATION: XR CHEST 1 VW-        HISTORY: SOB     A frontal projection of the chest is obtained. There is no previous  study for comparison.     The lungs are well-expanded.     A few granulomas are seen in the lung     Particularly the left upper lung.     There is no recent infiltrate. There is no pleural effusion, pulmonary  congestion or pneumothorax.     The heart is in the normal range.     There is no acute bony abnormality.        Impression:       1. No active cardiopulmonary disease.        This report was signed and finalized on 7/9/2025 6:08 AM by Dr. Kirk Kraus MD.                   I have reviewed the patient's current medications.      Assessment/Plan   Assessment       Active Hospital Problems     Diagnosis      **Pneumonia      Infection due to human metapneumovirus (hMPV)      Hypokalemia      Elevated LFTs                         CBC (No Diff) [NED558] (Order [id number])  Order  Date: 7/9/2025 Department: [facility] " 4B Released By/Authorizing: Anastasia Howell MD (auto-released)     Reprint Order Requisition    CBC (No Diff) (Order #[id number]) on 7/9/25         Contains abnormal data CBC (No Diff)  Order: [id number]   Status: Final result       Next appt: None    Test Result Released: No    Specimen Information: Blood   0 Result Notes       Component  Ref Range & Units (hover) 03:22 1 d ago   WBC 18.35 High  21.86 High    RBC 3.81 3.93   Hemoglobin 11.1 Low  11.7 Low    Hematocrit 33.5 Low  34.1   MCV 87.9 86.8   MCH 29.1 29.8   MCHC 33.1 34.3   RDW 13.8 13.8   RDW-SD 44.4 44.2   MPV 10.5 10.3   Platelets 188 197            Comprehensive Metabolic Panel [LAB17] (Order [id number])  Order  Date: 7/9/2025 Department: [facility] 4B Released By/Authorizing: Anastasia Howell MD (auto-released)     Reprint Order Requisition    Comprehensive Metabolic Panel (Order #[id number]) on 7/9/25         Contains abnormal data Comprehensive Metabolic Panel  Order: [id number]   Status: Final result       Next appt: None    Test Result Released: No    Specimen Information: Blood   0 Result Notes       Component  Ref Range & Units (hover) 03:22 1 d ago   Glucose 116 High  129 High    BUN 23.1 High  23.0 High    Creatinine 0.79 0.79   Sodium 142 142   Potassium 3.7 3.3 Low    Chloride 105 104   CO2 25.0 24.0   Calcium 8.7 9.1   Total Protein 6.6 7.2   Albumin 3.7 4.0   ALT (SGPT) 111 High  129 High    AST (SGOT) 44 High  50 High    Alkaline Phosphatase 132 High  145 High    Total Bilirubin 0.4 0.4   Globulin 2.9 3.2   A/G Ratio 1.3 1.3   BUN/Creatinine Ratio 29.2 High  29.1 High    Anion Gap 12.0 14.0   eGFR 87.4                 7/09/25 0418 98 (36.7) 62 16 168/85 Lying room air 93   0                           Current Facility-Administered Medications   Medication Dose Route Frequency Provider Last Rate Last Admin    acetaminophen (TYLENOL) tablet 650 mg  650 mg Oral Q6H PRN Anastasia Howell MD        aspirin EC tablet 81 mg  81 mg Oral Daily  Ron Elizondo, DO   81 mg at 07/09/25 1131    cefepime 2000 mg IVPB in 100 mL NS (MBP)  2,000 mg Intravenous Q8H Anastasia Howell MD   2,000 mg at 07/09/25 1000    doxycycline (VIBRAMYCIN) 100 mg in sodium chloride 0.9 % 100 mL MBP  100 mg Intravenous Q12H Anastasia Howell MD   100 mg at 07/09/25 0045    guaifenesin-dextromethorphan 600-30 mg (MUCINEX DM) 1 tablet  1 tablet Oral BID Anastasia Howell MD   1 tablet at 07/09/25 1000    ipratropium-albuterol (DUO-NEB) nebulizer solution 3 mL  3 mL Nebulization Q4H PRN Anastasia Howell MD        metoprolol succinate XL (TOPROL-XL) 24 hr tablet 25 mg  25 mg Oral Daily Anastasia Howell MD   25 mg at 07/09/25 1000    nitroglycerin (NITROSTAT) SL tablet 0.4 mg  0.4 mg Sublingual Q5 Min PRN Anastasia Howell MD        sodium chloride 0.9 % flush 10 mL  10 mL Intravenous Q12H Anastasia Howell MD   10 mL at 07/09/25 1000    sodium chloride 0.9 % flush 10 mL  10 mL Intravenous PRN Anastasia Howell MD        sodium chloride 0.9 % infusion 40 mL  40 mL Intravenous PRN Anastasia Howell MD

## 2025-07-09 NOTE — PLAN OF CARE
Goal Outcome Evaluation:  Plan of Care Reviewed With: patient      Progress: improving  Outcome Evaluation: [id number]. 8 beat run of v tach; AAOx4. Able to make needs known. Room air. IV intact and patent. No SOB. Denies pain. Bed low an locked with call light in reach. Isolation maintained. Spouse bedside.

## 2025-07-09 NOTE — PROGRESS NOTES
[facility] Medicine Services  INPATIENT PROGRESS NOTE    Patient Name: Laura Melo  Date of Admission: 7/8/2025  Today's Date: 07/09/25  Length of Stay: 1  Primary Care Physician: Tai Tom, [place]    Subjective   Chief Complaint: f/u pna    HPI   Patient was transferred here from outside facility overnight for pulmonary evaluation due to her lactic acid elevating with concurrent treatment for pneumonia.  She landed here on room air.  Afebrile overnight.  Vital stable.  Sats mid 90s on room air.  She states she feels better than she did before.  Denies chest pain.  No nausea.  Denies any abdominal pain currently with food but states she occasionally will get some cramping and bloating when she eats.        Review of Systems   All pertinent negatives and positives are as above. All other systems have been reviewed and are negative unless otherwise stated.     Objective    Temp:  [98 °F (36.7 °C)-98.1 °F (36.7 °C)] 98 °F (36.7 °C)  Heart Rate:  [62-77] 62  Resp:  [16-18] 16  BP: (166-168)/(65-85) 168/85  Physical Exam  GEN: Awake, alert, interactive, in NAD  HEENT:  PERRLA, EOMI, Anicteric, Trachea midline  Lungs: slightly diminished bs, no active wheezing heard  Heart: RRR, +S1/s2, no rub  ABD: soft, nt/nd, +BS, no guarding/rebound  Extremities: atraumatic, no cyanosis, no edema  Skin: no rashes or lesions  Neuro: AAOx3, no focal deficits  Psych: normal mood & affect        Results Review:  I have reviewed the labs, radiology results, and diagnostic studies.    Laboratory Data:   Results from last 7 days   Lab Units 07/09/25  0322 07/08/25  2322   WBC 10*3/mm3 18.35* 21.86*   HEMOGLOBIN g/dL 11.1* 11.7*   HEMATOCRIT % 33.5* 34.1   PLATELETS 10*3/mm3 188 197        Results from last 7 days   Lab Units 07/09/25  0322 07/08/25  2322   SODIUM mmol/L 142 142   POTASSIUM mmol/L 3.7 3.3*   CHLORIDE mmol/L 105 104   CO2 mmol/L 25.0 24.0   BUN mg/dL 23.1* 23.0*   CREATININE mg/dL  "0.79 0.79   CALCIUM mg/dL 8.7 9.1   BILIRUBIN mg/dL 0.4 0.4   ALK PHOS U/L 132* 145*   ALT (SGPT) U/L 111* 129*   AST (SGOT) U/L 44* 50*   GLUCOSE mg/dL 116* 129*       Culture Data:   No results found for: \"BLOODCX\", \"URINECX\", \"WOUNDCX\", \"MRSACX\", \"RESPCX\", \"STOOLCX\"    Radiology Data:   Imaging Results (Last 24 Hours)       Procedure Component Value Units Date/Time    US Liver - In process [[id number]] Resulted: 07/09/25 0816     Updated: 07/09/25 0850    This result has not been signed. Information might be incomplete.      XR Chest 1 View [[id number]] Collected: 07/09/25 0607     Updated: 07/09/25 0611    Narrative:      EXAMINATION: XR CHEST 1 VW-        HISTORY: SOB     A frontal projection of the chest is obtained. There is no previous  study for comparison.     The lungs are well-expanded.     A few granulomas are seen in the lung     Particularly the left upper lung.     There is no recent infiltrate. There is no pleural effusion, pulmonary  congestion or pneumothorax.     The heart is in the normal range.     There is no acute bony abnormality.       Impression:      1. No active cardiopulmonary disease.        This report was signed and finalized on 7/9/2025 6:08 AM by Dr. Kirk Kraus MD.               I have reviewed the patient's current medications.     Assessment/Plan   Assessment  Active Hospital Problems    Diagnosis     **Pneumonia     Infection due to human metapneumovirus (hMPV)     Hypokalemia     Elevated LFTs     Benign essential HTN        Treatment Plan  #1 URI/metapneumovirus -patient was sent here from outside facility where she was getting ceftriaxone and azithromycin for reported pneumonia.  Chest x-ray here does not show any overt pneumonia.She is on room air.  She does have a white count trending down but she was getting steroids.  Could still have bronchitis or secondary infection.  She has been covered currently with cefepime and doxycycline.  Plan to only do IV antibiotics " today and likely de-escalate to orals tomorrow given overall condition.  Supportive care.    #2 hypokalemia -replace    #3 essential hypertension -started back on home beta-blocker.  Monitor.  No active wheezing heard on exam    #4 elevated LFTs -possibly viral and/or in the setting of her statin.  She is not having any muscle aches or cramps.  Statin on hold.  Labs seem to be trending down.  Monitor.  Liver ultrasound was ordered and pending.    #5 hyperlipidemia -statin on hold due to elevated transaminases.    Medical Decision Making  Number and Complexity of problems: 3-4 acute, multiple chronic  Differential Diagnosis: as above    Conditions and Status        Seems to be improving, not yet at goal     MDM Data  External documents reviewed: none  Cardiac tracing (EKG, telemetry) interpretation: none  Radiology interpretation: cxr report reviewed  Labs reviewed: as above  Any tests that were considered but not ordered: oli     Decision rules/scores evaluated (example [id number], [place], etc): none     Discussed with: patient, pulmonology     Care Planning  Shared decision making: Patient apprised of current labs, vitals, imaging and treatment plan.  They are agreeable with proceeding with plans as discussed.    Code status and discussions: full code    Disposition  Social Determinants of Health that impact treatment or disposition: none  I expect the patient to be discharged to home likely tomorrow.          Electronically signed by Ron Elizondo DO, 07/09/25, 10:44 CDT.

## 2025-07-10 VITALS
RESPIRATION RATE: 18 BRPM | SYSTOLIC BLOOD PRESSURE: 120 MMHG | BODY MASS INDEX: 27.49 KG/M2 | HEIGHT: 65 IN | OXYGEN SATURATION: 93 % | TEMPERATURE: 98.7 F | HEART RATE: 69 BPM | WEIGHT: 165 LBS | DIASTOLIC BLOOD PRESSURE: 69 MMHG

## 2025-07-10 LAB
ALBUMIN SERPL-MCNC: 3.7 G/DL (ref 3.5–5.2)
ALBUMIN/GLOB SERPL: 1.4 G/DL
ALP SERPL-CCNC: 136 U/L (ref 39–117)
ALT SERPL W P-5'-P-CCNC: 99 U/L (ref 1–33)
ANION GAP SERPL CALCULATED.3IONS-SCNC: 11 MMOL/L (ref 5–15)
AST SERPL-CCNC: 33 U/L (ref 1–32)
BASOPHILS # BLD AUTO: 0.08 10*3/MM3 (ref 0–0.2)
BASOPHILS NFR BLD AUTO: 0.6 % (ref 0–1.5)
BILIRUB SERPL-MCNC: 0.6 MG/DL (ref 0–1.2)
BUN SERPL-MCNC: 26.6 MG/DL (ref 6–20)
BUN/CREAT SERPL: 38 (ref 7–25)
CALCIUM SPEC-SCNC: 8.5 MG/DL (ref 8.6–10.5)
CHLORIDE SERPL-SCNC: 104 MMOL/L (ref 98–107)
CO2 SERPL-SCNC: 22 MMOL/L (ref 22–29)
CREAT SERPL-MCNC: 0.7 MG/DL (ref 0.57–1)
DEPRECATED RDW RBC AUTO: 41.5 FL (ref 37–54)
EGFRCR SERPLBLD CKD-EPI 2021: 101 ML/MIN/1.73
EOSINOPHIL # BLD AUTO: 0 10*3/MM3 (ref 0–0.4)
EOSINOPHIL NFR BLD AUTO: 0 % (ref 0.3–6.2)
ERYTHROCYTE [DISTWIDTH] IN BLOOD BY AUTOMATED COUNT: 13.2 % (ref 12.3–15.4)
GLOBULIN UR ELPH-MCNC: 2.7 GM/DL
GLUCOSE SERPL-MCNC: 119 MG/DL (ref 65–99)
HCT VFR BLD AUTO: 34.2 % (ref 34–46.6)
HGB BLD-MCNC: 11.4 G/DL (ref 12–15.9)
IMM GRANULOCYTES # BLD AUTO: 0.42 10*3/MM3 (ref 0–0.05)
IMM GRANULOCYTES NFR BLD AUTO: 3.3 % (ref 0–0.5)
LYMPHOCYTES # BLD AUTO: 1.76 10*3/MM3 (ref 0.7–3.1)
LYMPHOCYTES NFR BLD AUTO: 14 % (ref 19.6–45.3)
MCH RBC QN AUTO: 28.9 PG (ref 26.6–33)
MCHC RBC AUTO-ENTMCNC: 33.3 G/DL (ref 31.5–35.7)
MCV RBC AUTO: 86.6 FL (ref 79–97)
MONOCYTES # BLD AUTO: 0.75 10*3/MM3 (ref 0.1–0.9)
MONOCYTES NFR BLD AUTO: 6 % (ref 5–12)
NEUTROPHILS NFR BLD AUTO: 76.1 % (ref 42.7–76)
NEUTROPHILS NFR BLD AUTO: 9.59 10*3/MM3 (ref 1.7–7)
NRBC BLD AUTO-RTO: 0 /100 WBC (ref 0–0.2)
PLATELET # BLD AUTO: 196 10*3/MM3 (ref 140–450)
PMV BLD AUTO: 10 FL (ref 6–12)
POTASSIUM SERPL-SCNC: 4.1 MMOL/L (ref 3.5–5.2)
PROT SERPL-MCNC: 6.4 G/DL (ref 6–8.5)
RBC # BLD AUTO: 3.95 10*6/MM3 (ref 3.77–5.28)
SODIUM SERPL-SCNC: 137 MMOL/L (ref 136–145)
WBC NRBC COR # BLD AUTO: 12.6 10*3/MM3 (ref 3.4–10.8)

## 2025-07-10 PROCEDURE — 63710000001 PREDNISONE PER 1 MG: Performed by: INTERNAL MEDICINE

## 2025-07-10 PROCEDURE — 99232 SBSQ HOSP IP/OBS MODERATE 35: CPT | Performed by: INTERNAL MEDICINE

## 2025-07-10 PROCEDURE — 80053 COMPREHEN METABOLIC PANEL: CPT | Performed by: INTERNAL MEDICINE

## 2025-07-10 PROCEDURE — 25010000002 CEFEPIME PER 500 MG

## 2025-07-10 PROCEDURE — 25010000002 DOXYCYCLINE 100 MG RECONSTITUTED SOLUTION 1 EACH VIAL

## 2025-07-10 PROCEDURE — 85025 COMPLETE CBC W/AUTO DIFF WBC: CPT | Performed by: INTERNAL MEDICINE

## 2025-07-10 RX ORDER — ALBUTEROL SULFATE 90 UG/1
2 INHALANT RESPIRATORY (INHALATION) EVERY 4 HOURS PRN
Qty: 18 G | Refills: 0 | Status: SHIPPED | OUTPATIENT
Start: 2025-07-10

## 2025-07-10 RX ORDER — DEXTROMETHORPHAN POLISTIREX 30 MG/5ML
60 SUSPENSION ORAL EVERY 12 HOURS SCHEDULED
Qty: 100 ML | Refills: 0 | Status: SHIPPED | OUTPATIENT
Start: 2025-07-10 | End: 2025-07-15

## 2025-07-10 RX ORDER — PREDNISONE 10 MG/1
TABLET ORAL
Qty: 6 TABLET | Refills: 0 | Status: SHIPPED | OUTPATIENT
Start: 2025-07-11 | End: 2025-07-15

## 2025-07-10 RX ORDER — LEVALBUTEROL TARTRATE 45 UG/1
2 AEROSOL, METERED ORAL 4 TIMES DAILY PRN
Qty: 15 G | Refills: 11 | Status: SHIPPED | OUTPATIENT
Start: 2025-07-10

## 2025-07-10 RX ORDER — DEXTROMETHORPHAN POLISTIREX 30 MG/5ML
60 SUSPENSION ORAL EVERY 12 HOURS SCHEDULED
Qty: 100 ML | Refills: 0 | Status: SHIPPED | OUTPATIENT
Start: 2025-07-10 | End: 2025-07-10

## 2025-07-10 RX ADMIN — AMOXICILLIN AND CLAVULANATE POTASSIUM 1 TABLET: 875; 125 TABLET, COATED ORAL at 09:36

## 2025-07-10 RX ADMIN — DOXYCYCLINE 100 MG: 100 INJECTION, POWDER, LYOPHILIZED, FOR SOLUTION INTRAVENOUS at 01:35

## 2025-07-10 RX ADMIN — GUAIFENESIN AND DEXTROMETHORPHAN HYDROBROMIDE 1 TABLET: 600; 30 TABLET, EXTENDED RELEASE ORAL at 09:37

## 2025-07-10 RX ADMIN — FAMOTIDINE 20 MG: 20 TABLET, FILM COATED ORAL at 09:36

## 2025-07-10 RX ADMIN — ASPIRIN 81 MG: 81 TABLET, COATED ORAL at 09:36

## 2025-07-10 RX ADMIN — METOPROLOL SUCCINATE 25 MG: 25 TABLET, EXTENDED RELEASE ORAL at 09:36

## 2025-07-10 RX ADMIN — PREDNISONE 40 MG: 20 TABLET ORAL at 09:36

## 2025-07-10 RX ADMIN — CEFEPIME 2000 MG: 2 INJECTION, POWDER, FOR SOLUTION INTRAVENOUS at 02:43

## 2025-07-10 RX ADMIN — Medication 10 ML: at 09:37

## 2025-07-10 NOTE — DISCHARGE SUMMARY
[facility] Medicine Services  DISCHARGE SUMMARY       Date of Admission: 7/8/2025  Date of Discharge:  7/10/2025  Primary Care Physician: Tai Tom APRN    Presenting Problem/History of Present Illness:  Transfer for pulmonary evaluation    Final Discharge Diagnoses:  Active Hospital Problems    Diagnosis     **Pneumonia     Infection due to human metapneumovirus (hMPV)     Hypokalemia     Elevated LFTs     Benign essential HTN        Consults:   Dr. Reyes, pulmonology    Procedures Performed: none    Pertinent Test Results:   No results found for this or any previous visit.      Imaging Results (All)       Procedure Component Value Units Date/Time    US Liver [[id number]] Collected: 07/09/25 1331     Updated: 07/09/25 1451    Narrative:      EXAM: US LIVER-      DATE: 7/9/2025 7:16 AM     HISTORY: Elevated LFTs, and CT scan from gallbladder wall thickening and  gallstones.       COMPARISON: 7/5/2025 from [facility] with outside report reviewed  in a hard copy at time of dictation     TECHNIQUE:  Real-time ultrasound performed with representative images  and report stored to PACS per institutional protocol.     FINDINGS:  PANCREAS. Partially visualized pancreas within normal limits.     AORTA. Partially visualized aorta is within normal limits.     IVC. Partially visualized IVC within normal limits.     LIVER. Patent portal vein with normal hepatopetal flow. Mildly echogenic  liver.     GALLBLADDER. Gallbladder decompressed with wall measuring 4 mm. There  may be pericholecystic fluid. Notes an area of possible free fluid,  which on multiple transverse images is favored to represent the  duodenum.     COMMON BILE DUCT. Measures 0.6 cm, within normal limits.     OTHER. RIGHT kidney measures 10.8 x 4.5 x 5.3 cm. Normal renal  echogenicity and contour. No hydronephrosis demonstrated.          Impression:      1. Gallbladder is decompressed with wall thickening.  Possible  pericholecystic fluid. Consider hypoproteinemia, fluid overload, or  chronic cholecystitis. Acute cholecystitis considered less likely given  gallbladder is decompressed.  2. Mildly echogenic liver, which can be seen with fatty liver.           This report was signed and finalized on 7/9/2025 2:48 PM by Dr Lauren Mcintosh MD.       CT Outside Chest [[id number]] Resulted: 07/09/25 1405     Updated: 07/09/25 1405    Narrative:      This procedure was auto-finalized with no dictation required.    CT Outside Chest [[id number]] Resulted: 07/09/25 1404     Updated: 07/09/25 1404    Narrative:      This procedure was auto-finalized with no dictation required.    XR Chest 1 View [[id number]] Collected: 07/09/25 0607     Updated: 07/09/25 0611    Narrative:      EXAMINATION: XR CHEST 1 VW-        HISTORY: SOB     A frontal projection of the chest is obtained. There is no previous  study for comparison.     The lungs are well-expanded.     A few granulomas are seen in the lung     Particularly the left upper lung.     There is no recent infiltrate. There is no pleural effusion, pulmonary  congestion or pneumothorax.     The heart is in the normal range.     There is no acute bony abnormality.       Impression:      1. No active cardiopulmonary disease.        This report was signed and finalized on 7/9/2025 6:08 AM by Dr. Kirk Kraus MD.             LAB RESULTS:      Lab 07/10/25  0415 07/09/25  0322 07/08/25  2322   WBC 12.60* 18.35* 21.86*   HEMOGLOBIN 11.4* 11.1* 11.7*   HEMATOCRIT 34.2 33.5* 34.1   PLATELETS 196 188 197   NEUTROS ABS 9.59*  --  17.01*   IMMATURE GRANS (ABS) 0.42*  --  0.89*   LYMPHS ABS 1.76  --  2.13   MONOS ABS 0.75  --  1.74*   EOS ABS 0.00  --  0.00   MCV 86.6 87.9 86.8   CRP  --   --  <0.30   PROCALCITONIN  --   --  0.03   LACTATE  --   --  2.0   PROTIME  --   --  14.3   APTT  --   --  21.4*         Lab 07/10/25  0415 07/09/25  0322 07/08/25  2322   SODIUM 137 142 142   POTASSIUM 4.1  3.7 3.3*   CHLORIDE 104 105 104   CO2 22.0 25.0 24.0   ANION GAP 11.0 12.0 14.0   BUN 26.6* 23.1* 23.0*   CREATININE 0.70 0.79 0.79   EGFR 101.0 87.4 87.4   GLUCOSE 119* 116* 129*   CALCIUM 8.5* 8.7 9.1   MAGNESIUM  --  2.2 2.3   PHOSPHORUS  --  3.4 3.1         Lab 07/10/25  0415 07/09/25  0322 07/08/25  2322   TOTAL PROTEIN 6.4 6.6 7.2   ALBUMIN 3.7 3.7 4.0   GLOBULIN 2.7 2.9 3.2   ALT (SGPT) 99* 111* 129*   AST (SGOT) 33* 44* 50*   BILIRUBIN 0.6 0.4 0.4   ALK PHOS 136* 132* 145*         Lab 07/08/25 2322   PROTIME 14.3   INR 1.05                 Brief Urine Lab Results       None          Microbiology Results (last 10 days)       Procedure Component Value - Date/Time    Blood Culture - Blood, Arm, Right [[id number]]  (Normal) Collected: 07/08/25 2322    Lab Status: Preliminary result Specimen: Blood from Arm, Right Updated: 07/09/25 2331     Blood Culture No growth at 24 hours    Blood Culture - Blood, Arm, Left [[id number]]  (Normal) Collected: 07/08/25 2322    Lab Status: Preliminary result Specimen: Blood from Arm, Left Updated: 07/09/25 2346     Blood Culture No growth at 24 hours    MRSA Screen, PCR (Inpatient) - Swab, Nares [[id number]]  (Normal) Collected: 07/08/25 2308    Lab Status: Final result Specimen: Swab from Nares Updated: 07/09/25 0052     MRSA PCR No MRSA Detected    Narrative:      The negative predictive value of this diagnostic test is high and should only be used to consider de-escalating anti-MRSA therapy. A positive result may indicate colonization with MRSA and must be correlated clinically.    Respiratory Panel PCR w/COVID-19(SARS-CoV-2) [facility] In-House, NP Swab in UTM/VTM, 2 HR TAT - Swab, Nasopharynx [[id number]]  (Abnormal) Collected: 07/08/25 2303    Lab Status: Final result Specimen: Swab from Nasopharynx Updated: 07/09/25 0109     ADENOVIRUS, PCR Not Detected     Coronavirus 229E Not Detected     Coronavirus HKU1 Not Detected     Coronavirus NL63 Not Detected      Coronavirus OC43 Not Detected     COVID19 Not Detected     Human Metapneumovirus Detected     Human Rhinovirus/Enterovirus Not Detected     Influenza A PCR Not Detected     Influenza B PCR Not Detected     Parainfluenza Virus 1 Not Detected     Parainfluenza Virus 2 Not Detected     Parainfluenza Virus 3 Not Detected     Parainfluenza Virus 4 Not Detected     RSV, PCR Not Detected     Bordetella pertussis pcr Not Detected     Bordetella parapertussis PCR Not Detected     Chlamydophila pneumoniae PCR Not Detected     Mycoplasma pneumo by PCR Not Detected    Narrative:      In the setting of a positive respiratory panel with a viral infection PLUS a negative procalcitonin without other underlying concern for bacterial infection, consider observing off antibiotics or discontinuation of antibiotics and continue supportive care. If the respiratory panel is positive for atypical bacterial infection (Bordetella pertussis, Chlamydophila pneumoniae, or Mycoplasma pneumoniae), consider antibiotic de-escalation to target atypical bacterial infection.            Hospital Course:   Patient is a 55-year-old female with history of hypertension and no known pulmonary disease who was admitted at an outside facility for pneumonia being treated with ceftriaxone and azithromycin.  She also was receiving some steroids and nebulizers and seem to be doing okay but her lactic acid had increased from 2.2-4.4 so we were asked to take the patient in transfer for pulmonary evaluation.  Patient arrived here in the evening of 7/8.  On room air.  Doing pretty well clinically.  Chest x-ray did not show any overt dense pneumonia.  Respiratory viral panel positive for human metapneumovirus.  She was started on cefepime and doxycycline.  Her Pro-Akash was actually normal.  She had an elevated white count but was trending down.  Unclear how much steroid she had at outside hospital.  She was seen by pulmonary here and evaluated.  She continues doing  "well.  Will send her home today with Augmentin for possible bronchitis or URI in addition to short steroid taper.  Will give a rescue inhaler at discharge.  Plan for outpatient pulmonary follow-up.  Of note patient's liver enzymes are slightly elevated on arrival.  Possibly effect of the virus.  Patient is on statin which has been on hold.  Enzymes are trending down.  Liver ultrasound showed no obvious acute cholecystitis although she could have some mild chronic gallbladder issues.  Currently 7 no abdominal pain eating without issues.  Will continue to hold statin at discharge until follow-up PCP and rechecking of labs.  Further workup workup for gallbladder as felt necessary through PCP.      Physical Exam on Discharge:  /72 (BP Location: Right arm, Patient Position: Lying)   Pulse 50   Temp 98 °F (36.7 °C) (Oral)   Resp 16   Ht 165.1 cm (65\")   Wt 74.8 kg (165 lb)   LMP 01/01/2014   SpO2 97%   BMI 27.46 kg/m²   Physical Exam  GEN: Awake, alert, interactive, in NAD  HEENT:  PERRLA, EOMI, Anicteric, Trachea midline  Lungs: no wheezing/rales/rhonchi  Heart: RRR, +S1/s2, no rub  ABD: soft, nt/nd, +BS, no guarding/rebound  Extremities: atraumatic, no cyanosis, no edema  Skin: no rashes or lesions  Neuro: AAOx3, no focal deficits  Psych: normal mood & affect      Condition on Discharge: stable/improved    Discharge Disposition:  Home or Self Care    Discharge Medications:     Discharge Medications        PAUSE taking these medications        Instructions Start Date   atorvastatin 20 MG tablet  Wait to take this until your doctor or other care provider tells you to start again.  Commonly known as: LIPITOR   20 mg, Oral, Daily             New Medications        Instructions Start Date   albuterol sulfate  (90 Base) MCG/ACT inhaler  Commonly known as: PROVENTIL HFA;VENTOLIN HFA;PROAIR HFA   2 puffs, Inhalation, Every 4 Hours PRN      amoxicillin-clavulanate 875-125 MG per tablet  Commonly known as: " AUGMENTIN   1 tablet, Oral, Every 12 Hours Scheduled      dextromethorphan polistirex ER 30 MG/5ML Suspension Extended Release oral suspension  Commonly known as: Delsym   60 mg, Oral, Every 12 Hours Scheduled      levalbuterol 45 MCG/ACT inhaler  Commonly known as: XOPENEX HFA   2 puffs, Inhalation, 4 Times Daily PRN      predniSONE 10 MG tablet  Commonly known as: DELTASONE   Take 2 tablets by mouth Daily for 2 days, THEN 1 tablet Daily for 2 days.   Start Date: July 11, 2025            Continue These Medications        Instructions Start Date   aspirin 81 MG EC tablet   81 mg, 2 times daily      meclizine 25 MG tablet  Commonly known as: ANTIVERT   25 mg, Oral, Every 6 Hours PRN      metoprolol succinate XL 50 MG 24 hr tablet  Commonly known as: TOPROL-XL   50 mg, Daily      PARoxetine 10 MG tablet  Commonly known as: PAXIL   10 mg, Oral, Every Morning               Discharge Diet:   Dietary Orders (From admission, onward)       Start     Ordered    07/09/25 1022  Diet: Regular/House; Fluid Consistency: Thin (IDDSI 0)  Diet Effective Now        References:    Diet Order Definitions   Question Answer Comment   Diets: Regular/House    Fluid Consistency: Thin (IDDSI 0)        07/09/25 1021                      Activity at Discharge:   As prior    Follow-up Appointments:   No future appointments.    Test Results Pending at Discharge: none    Electronically signed by Ron Elizondo DO, 07/10/25, 08:27 CDT.    Time: 28 minutes.

## 2025-07-10 NOTE — PROGRESS NOTES
[facility] PULMONARY AND CRITICAL CARE PROGRESS NOTE - [facility]    Patient: Laura Melo  1970   MR# [id number]   Acct# [id number]  07/10/25   10:32 CDT  Referring Provider: Ron Elizondo DO    Chief Complaint: Wheezing  Interval history: She feels she is doing a little bit better.  Meds:  amoxicillin-clavulanate, 1 tablet, Oral, Q12H  aspirin, 81 mg, Oral, Daily  famotidine, 20 mg, Oral, BID AC  guaifenesin-dextromethorphan 600-30 mg, 1 tablet, Oral, BID  metoprolol succinate XL, 25 mg, Oral, Daily  predniSONE, 40 mg, Oral, Daily With Breakfast  sodium chloride, 10 mL, Intravenous, Q12H         Physical Exam:  SpO2 Percentage    07/09/25 1602 07/09/25 2139 07/10/25 0358   SpO2: 94% 97% 97%     Body mass index is 27.46 kg/m².   Temp:  [98 °F (36.7 °C)-98.2 °F (36.8 °C)] 98 °F (36.7 °C)  Heart Rate:  [50-61] 50  Resp:  [16] 16  BP: (121-149)/(64-82) 146/72No intake or output data in the 24 hours ending 07/10/25 1032  Physical Exam  Constitutional:       General: She is not in acute distress.     Appearance: She is ill-appearing.   Pulmonary:      Effort: No respiratory distress.      Breath sounds: No stridor.   Neurological:      Mental Status: She is alert.       Laboratory Data:  Results from last 7 days   Lab Units 07/10/25  0415 07/09/25  0322 07/08/25  2322   WBC 10*3/mm3 12.60* 18.35* 21.86*   HEMOGLOBIN g/dL 11.4* 11.1* 11.7*   PLATELETS 10*3/mm3 196 188 197     Results from last 7 days   Lab Units 07/10/25  0415 07/09/25  0322 07/08/25  2322   SODIUM mmol/L 137 142 142   POTASSIUM mmol/L 4.1 3.7 3.3*   BUN mg/dL 26.6* 23.1* 23.0*   CREATININE mg/dL 0.70 0.79 0.79   INR   --   --  1.05         Microbiology Results (last 10 days)       Procedure Component Value - Date/Time    Blood Culture - Blood, Arm, Right [[id number]]  (Normal) Collected: 07/08/25 2322    Lab Status: Preliminary result Specimen: Blood from Arm, Right Updated: 07/09/25 2331     Blood Culture No growth at 24 hours     Blood Culture - Blood, Arm, Left [[id number]]  (Normal) Collected: 07/08/25 2322    Lab Status: Preliminary result Specimen: Blood from Arm, Left Updated: 07/09/25 2346     Blood Culture No growth at 24 hours    MRSA Screen, PCR (Inpatient) - Swab, Nares [[id number]]  (Normal) Collected: 07/08/25 2308    Lab Status: Final result Specimen: Swab from Nares Updated: 07/09/25 0052     MRSA PCR No MRSA Detected    Narrative:      The negative predictive value of this diagnostic test is high and should only be used to consider de-escalating anti-MRSA therapy. A positive result may indicate colonization with MRSA and must be correlated clinically.    Respiratory Panel PCR w/COVID-19(SARS-CoV-2) [facility] In-House, NP Swab in UTM/VTM, 2 HR TAT - Swab, Nasopharynx [[id number]]  (Abnormal) Collected: 07/08/25 2303    Lab Status: Final result Specimen: Swab from Nasopharynx Updated: 07/09/25 0109     ADENOVIRUS, PCR Not Detected     Coronavirus 229E Not Detected     Coronavirus HKU1 Not Detected     Coronavirus NL63 Not Detected     Coronavirus OC43 Not Detected     COVID19 Not Detected     Human Metapneumovirus Detected     Human Rhinovirus/Enterovirus Not Detected     Influenza A PCR Not Detected     Influenza B PCR Not Detected     Parainfluenza Virus 1 Not Detected     Parainfluenza Virus 2 Not Detected     Parainfluenza Virus 3 Not Detected     Parainfluenza Virus 4 Not Detected     RSV, PCR Not Detected     Bordetella pertussis pcr Not Detected     Bordetella parapertussis PCR Not Detected     Chlamydophila pneumoniae PCR Not Detected     Mycoplasma pneumo by PCR Not Detected    Narrative:      In the setting of a positive respiratory panel with a viral infection PLUS a negative procalcitonin without other underlying concern for bacterial infection, consider observing off antibiotics or discontinuation of antibiotics and continue supportive care. If the respiratory panel is positive for atypical bacterial  infection (Bordetella pertussis, Chlamydophila pneumoniae, or Mycoplasma pneumoniae), consider antibiotic de-escalation to target atypical bacterial infection.          Recent films:  US Liver  Result Date: 7/9/2025  1. Gallbladder is decompressed with wall thickening. Possible pericholecystic fluid. Consider hypoproteinemia, fluid overload, or chronic cholecystitis. Acute cholecystitis considered less likely given gallbladder is decompressed. 2. Mildly echogenic liver, which can be seen with fatty liver.    This report was signed and finalized on 7/9/2025 2:48 PM by Dr Lauren Mcintosh MD.      XR Chest 1 View  Result Date: 7/9/2025  1. No active cardiopulmonary disease.   This report was signed and finalized on 7/9/2025 6:08 AM by Dr. Kirk Kraus MD.        Pulmonary Assessment:  Viral pneumonia, improved  Wheezing likely related to the above.  Cannot rule out underlying obstructive lung disease i.e. asthma    Recommend:   Okay to home  Prescription for levalbuterol inhaler  Use albuterol if insurance refuses to give her the levalbuterol  Follow up in office with pft    Electronically signed by Jovi Reyes MD, 07/10/25, 10:32 CDT

## 2025-07-10 NOTE — PLAN OF CARE
Goal Outcome Evaluation:   Pt resting in bed, resp even, no complaints of chest pain or dyspnea during shift, pt has slept all night, no needs voiced this am, call light in reach

## 2025-07-11 NOTE — PAYOR COMM NOTE
"DC HOME 7-10-25    Laura Melo (55 y.o. Female)       Date of Birth   1970    Social Security Number       Address   [address] [address]    Home Phone   [phone]    MRN   [id number]       [facility]    Marital Status                               Admission Date   7/8/2025    Admission Type   Urgent    Admitting Provider   Ron Elizondo DO    Attending Provider       Department, Room/Bed   [facility] 4B, 447/1       Discharge Date   7/10/2025    Discharge Disposition   Home or Self Care    Discharge Destination                                 Attending Provider: (none)   Allergies: No Known Allergies    Isolation: None   Infection: Human Metapneumovirus  (07/09/25)   Code Status: Prior    Ht: 165.1 cm (65\")   Wt: 74.8 kg (165 lb)    Admission Cmt: None   Principal Problem: Pneumonia [J18.9]                   Active Insurance as of 7/8/2025       Primary Coverage       Payor Plan Insurance Group Employer/Plan Group    [employer]HE[employer] BLUE CROSS AN[employer] BLUE CROSS BLUE [employer]IELD PPO [id number]       Payor Plan Address Payor Plan Phone Number Payor Plan Fax Number Effective Dates    [address] [phone]  12/1/2020 - None Entered    [place] [address]         Subscriber Name Subscriber Birth Date Member ID       LAURA MELO 1970 [id number]                     Emergency Contacts        (Rel.) Home Phone Work Phone Mobile Phone    DAMIÁN MELO (Spouse) [phone] -- --                 Discharge Summary        Ron Elizondo DO at 07/10/25 0827                [facility] Medicine Services  DISCHARGE SUMMARY       Date of Admission: 7/8/2025  Date of Discharge:  7/10/2025  Primary Care Physician: Tai Tom, APRN    Presenting Problem/History of Present Illness:  Transfer for pulmonary evaluation    Final Discharge Diagnoses:  Active Hospital Problems    Diagnosis     **Pneumonia     " Infection due to human metapneumovirus (hMPV)     Hypokalemia     Elevated LFTs     Benign essential HTN        Consults:   Dr. Reyes, pulmonology    Procedures Performed: none    Pertinent Test Results:   No results found for this or any previous visit.      Imaging Results (All)       Procedure Component Value Units Date/Time    US Liver [[id number]] Collected: 07/09/25 1331     Updated: 07/09/25 1451    Narrative:      EXAM: US LIVER-      DATE: 7/9/2025 7:16 AM     HISTORY: Elevated LFTs, and CT scan from gallbladder wall thickening and  gallstones.       COMPARISON: 7/5/2025 from [facility] with outside report reviewed  in a hard copy at time of dictation     TECHNIQUE:  Real-time ultrasound performed with representative images  and report stored to PACS per institutional protocol.     FINDINGS:  PANCREAS. Partially visualized pancreas within normal limits.     AORTA. Partially visualized aorta is within normal limits.     IVC. Partially visualized IVC within normal limits.     LIVER. Patent portal vein with normal hepatopetal flow. Mildly echogenic  liver.     GALLBLADDER. Gallbladder decompressed with wall measuring 4 mm. There  may be pericholecystic fluid. Notes an area of possible free fluid,  which on multiple transverse images is favored to represent the  duodenum.     COMMON BILE DUCT. Measures 0.6 cm, within normal limits.     OTHER. RIGHT kidney measures 10.8 x 4.5 x 5.3 cm. Normal renal  echogenicity and contour. No hydronephrosis demonstrated.          Impression:      1. Gallbladder is decompressed with wall thickening. Possible  pericholecystic fluid. Consider hypoproteinemia, fluid overload, or  chronic cholecystitis. Acute cholecystitis considered less likely given  gallbladder is decompressed.  2. Mildly echogenic liver, which can be seen with fatty liver.           This report was signed and finalized on 7/9/2025 2:48 PM by Dr Lauren Mcintosh MD.       CT Outside Chest [[id number]]  Resulted: 07/09/25 1405     Updated: 07/09/25 1405    Narrative:      This procedure was auto-finalized with no dictation required.    CT Outside Chest [[id number]] Resulted: 07/09/25 1404     Updated: 07/09/25 1404    Narrative:      This procedure was auto-finalized with no dictation required.    XR Chest 1 View [[id number]] Collected: 07/09/25 0607     Updated: 07/09/25 0611    Narrative:      EXAMINATION: XR CHEST 1 VW-        HISTORY: SOB     A frontal projection of the chest is obtained. There is no previous  study for comparison.     The lungs are well-expanded.     A few granulomas are seen in the lung     Particularly the left upper lung.     There is no recent infiltrate. There is no pleural effusion, pulmonary  congestion or pneumothorax.     The heart is in the normal range.     There is no acute bony abnormality.       Impression:      1. No active cardiopulmonary disease.        This report was signed and finalized on 7/9/2025 6:08 AM by Dr. Kirk Kraus MD.             LAB RESULTS:      Lab 07/10/25  0415 07/09/25  0322 07/08/25  2322   WBC 12.60* 18.35* 21.86*   HEMOGLOBIN 11.4* 11.1* 11.7*   HEMATOCRIT 34.2 33.5* 34.1   PLATELETS 196 188 197   NEUTROS ABS 9.59*  --  17.01*   IMMATURE GRANS (ABS) 0.42*  --  0.89*   LYMPHS ABS 1.76  --  2.13   MONOS ABS 0.75  --  1.74*   EOS ABS 0.00  --  0.00   MCV 86.6 87.9 86.8   CRP  --   --  <0.30   PROCALCITONIN  --   --  0.03   LACTATE  --   --  2.0   PROTIME  --   --  14.3   APTT  --   --  21.4*         Lab 07/10/25  0415 07/09/25  0322 07/08/25  2322   SODIUM 137 142 142   POTASSIUM 4.1 3.7 3.3*   CHLORIDE 104 105 104   CO2 22.0 25.0 24.0   ANION GAP 11.0 12.0 14.0   BUN 26.6* 23.1* 23.0*   CREATININE 0.70 0.79 0.79   EGFR 101.0 87.4 87.4   GLUCOSE 119* 116* 129*   CALCIUM 8.5* 8.7 9.1   MAGNESIUM  --  2.2 2.3   PHOSPHORUS  --  3.4 3.1         Lab 07/10/25  0415 07/09/25  0322 07/08/25  2322   TOTAL PROTEIN 6.4 6.6 7.2   ALBUMIN 3.7 3.7 4.0   GLOBULIN 2.7  2.9 3.2   ALT (SGPT) 99* 111* 129*   AST (SGOT) 33* 44* 50*   BILIRUBIN 0.6 0.4 0.4   ALK PHOS 136* 132* 145*         Lab 07/08/25 2322   PROTIME 14.3   INR 1.05                 Brief Urine Lab Results       None          Microbiology Results (last 10 days)       Procedure Component Value - Date/Time    Blood Culture - Blood, Arm, Right [[id number]]  (Normal) Collected: 07/08/25 2322    Lab Status: Preliminary result Specimen: Blood from Arm, Right Updated: 07/09/25 2331     Blood Culture No growth at 24 hours    Blood Culture - Blood, Arm, Left [[id number]]  (Normal) Collected: 07/08/25 2322    Lab Status: Preliminary result Specimen: Blood from Arm, Left Updated: 07/09/25 2346     Blood Culture No growth at 24 hours    MRSA Screen, PCR (Inpatient) - Swab, Nares [[id number]]  (Normal) Collected: 07/08/25 2308    Lab Status: Final result Specimen: Swab from Nares Updated: 07/09/25 0052     MRSA PCR No MRSA Detected    Narrative:      The negative predictive value of this diagnostic test is high and should only be used to consider de-escalating anti-MRSA therapy. A positive result may indicate colonization with MRSA and must be correlated clinically.    Respiratory Panel PCR w/COVID-19(SARS-CoV-2) [facility] In-House, NP Swab in UTM/VTM, 2 HR TAT - Swab, Nasopharynx [[id number]]  (Abnormal) Collected: 07/08/25 2303    Lab Status: Final result Specimen: Swab from Nasopharynx Updated: 07/09/25 0109     ADENOVIRUS, PCR Not Detected     Coronavirus 229E Not Detected     Coronavirus HKU1 Not Detected     Coronavirus NL63 Not Detected     Coronavirus OC43 Not Detected     COVID19 Not Detected     Human Metapneumovirus Detected     Human Rhinovirus/Enterovirus Not Detected     Influenza A PCR Not Detected     Influenza B PCR Not Detected     Parainfluenza Virus 1 Not Detected     Parainfluenza Virus 2 Not Detected     Parainfluenza Virus 3 Not Detected     Parainfluenza Virus 4 Not Detected     RSV, PCR Not  Detected     Bordetella pertussis pcr Not Detected     Bordetella parapertussis PCR Not Detected     Chlamydophila pneumoniae PCR Not Detected     Mycoplasma pneumo by PCR Not Detected    Narrative:      In the setting of a positive respiratory panel with a viral infection PLUS a negative procalcitonin without other underlying concern for bacterial infection, consider observing off antibiotics or discontinuation of antibiotics and continue supportive care. If the respiratory panel is positive for atypical bacterial infection (Bordetella pertussis, Chlamydophila pneumoniae, or Mycoplasma pneumoniae), consider antibiotic de-escalation to target atypical bacterial infection.            Hospital Course:   Patient is a 55-year-old female with history of hypertension and no known pulmonary disease who was admitted at an outside facility for pneumonia being treated with ceftriaxone and azithromycin.  She also was receiving some steroids and nebulizers and seem to be doing okay but her lactic acid had increased from 2.2-4.4 so we were asked to take the patient in transfer for pulmonary evaluation.  Patient arrived here in the evening of 7/8.  On room air.  Doing pretty well clinically.  Chest x-ray did not show any overt dense pneumonia.  Respiratory viral panel positive for human metapneumovirus.  She was started on cefepime and doxycycline.  Her Pro-Akash was actually normal.  She had an elevated white count but was trending down.  Unclear how much steroid she had at outside hospital.  She was seen by pulmonary here and evaluated.  She continues doing well.  Will send her home today with Augmentin for possible bronchitis or URI in addition to short steroid taper.  Will give a rescue inhaler at discharge.  Plan for outpatient pulmonary follow-up.  Of note patient's liver enzymes are slightly elevated on arrival.  Possibly effect of the virus.  Patient is on statin which has been on hold.  Enzymes are trending down.  Liver  "ultrasound showed no obvious acute cholecystitis although she could have some mild chronic gallbladder issues.  Currently 7 no abdominal pain eating without issues.  Will continue to hold statin at discharge until follow-up PCP and rechecking of labs.  Further workup workup for gallbladder as felt necessary through PCP.      Physical Exam on Discharge:  /72 (BP Location: Right arm, Patient Position: Lying)   Pulse 50   Temp 98 °F (36.7 °C) (Oral)   Resp 16   Ht 165.1 cm (65\")   Wt 74.8 kg (165 lb)   LMP 01/01/2014   SpO2 97%   BMI 27.46 kg/m²   Physical Exam  GEN: Awake, alert, interactive, in NAD  HEENT:  PERRLA, EOMI, Anicteric, Trachea midline  Lungs: no wheezing/rales/rhonchi  Heart: RRR, +S1/s2, no rub  ABD: soft, nt/nd, +BS, no guarding/rebound  Extremities: atraumatic, no cyanosis, no edema  Skin: no rashes or lesions  Neuro: AAOx3, no focal deficits  Psych: normal mood & affect      Condition on Discharge: stable/improved    Discharge Disposition:  Home or Self Care    Discharge Medications:     Discharge Medications        PAUSE taking these medications        Instructions Start Date   atorvastatin 20 MG tablet  Wait to take this until your doctor or other care provider tells you to start again.  Commonly known as: LIPITOR   20 mg, Oral, Daily             New Medications        Instructions Start Date   albuterol sulfate  (90 Base) MCG/ACT inhaler  Commonly known as: PROVENTIL HFA;VENTOLIN HFA;PROAIR HFA   2 puffs, Inhalation, Every 4 Hours PRN      amoxicillin-clavulanate 875-125 MG per tablet  Commonly known as: AUGMENTIN   1 tablet, Oral, Every 12 Hours Scheduled      dextromethorphan polistirex ER 30 MG/5ML Suspension Extended Release oral suspension  Commonly known as: Delsym   60 mg, Oral, Every 12 Hours Scheduled      levalbuterol 45 MCG/ACT inhaler  Commonly known as: XOPENEX HFA   2 puffs, Inhalation, 4 Times Daily PRN      predniSONE 10 MG tablet  Commonly known as: " DELTASONE   Take 2 tablets by mouth Daily for 2 days, THEN 1 tablet Daily for 2 days.   Start Date: July 11, 2025            Continue These Medications        Instructions Start Date   aspirin 81 MG EC tablet   81 mg, 2 times daily      meclizine 25 MG tablet  Commonly known as: ANTIVERT   25 mg, Oral, Every 6 Hours PRN      metoprolol succinate XL 50 MG 24 hr tablet  Commonly known as: TOPROL-XL   50 mg, Daily      PARoxetine 10 MG tablet  Commonly known as: PAXIL   10 mg, Oral, Every Morning               Discharge Diet:   Dietary Orders (From admission, onward)       Start     Ordered    07/09/25 1022  Diet: Regular/House; Fluid Consistency: Thin (IDDSI 0)  Diet Effective Now        References:    Diet Order Definitions   Question Answer Comment   Diets: Regular/House    Fluid Consistency: Thin (IDDSI 0)        07/09/25 1021                      Activity at Discharge:   As prior    Follow-up Appointments:   No future appointments.    Test Results Pending at Discharge: none    Electronically signed by Ron Elizondo DO, 07/10/25, 08:27 CDT.    Time: 28 minutes.          Electronically signed by Ron Elizondo DO at 07/10/25 2434

## 2025-07-13 LAB
BACTERIA SPEC AEROBE CULT: NORMAL
BACTERIA SPEC AEROBE CULT: NORMAL
QT INTERVAL: 468 MS
QTC INTERVAL: 475 MS

## 2025-08-06 ENCOUNTER — OFFICE VISIT (OUTPATIENT)
Dept: SURGERY | Facility: CLINIC | Age: 55
End: 2025-08-06
Payer: COMMERCIAL

## 2025-08-06 VITALS
BODY MASS INDEX: 28.11 KG/M2 | DIASTOLIC BLOOD PRESSURE: 96 MMHG | SYSTOLIC BLOOD PRESSURE: 150 MMHG | HEIGHT: 65 IN | WEIGHT: 168.7 LBS

## 2025-08-06 DIAGNOSIS — K80.20 GALLSTONES: Primary | ICD-10-CM

## 2025-08-06 RX ORDER — ENOXAPARIN SODIUM 100 MG/ML
40 INJECTION SUBCUTANEOUS DAILY
OUTPATIENT
Start: 2025-08-06

## 2025-08-06 RX ORDER — INDOCYANINE GREEN AND WATER 25 MG
3.75 KIT INJECTION ONCE
OUTPATIENT
Start: 2025-08-06 | End: 2025-08-06

## 2025-08-12 RX ORDER — HYDROCHLOROTHIAZIDE 12.5 MG/1
12.5 CAPSULE ORAL EVERY MORNING
COMMUNITY

## 2025-08-12 RX ORDER — DIAZEPAM 5 MG/1
5 TABLET ORAL
COMMUNITY

## 2025-08-12 RX ORDER — CETIRIZINE HYDROCHLORIDE, PSEUDOEPHEDRINE HYDROCHLORIDE 5; 120 MG/1; MG/1
1 TABLET, FILM COATED, EXTENDED RELEASE ORAL 2 TIMES DAILY
COMMUNITY

## 2025-08-12 RX ORDER — ONDANSETRON 8 MG/1
8 TABLET, ORALLY DISINTEGRATING ORAL EVERY 8 HOURS SCHEDULED
COMMUNITY

## 2025-08-20 ENCOUNTER — OFFICE VISIT (OUTPATIENT)
Dept: PULMONOLOGY | Facility: CLINIC | Age: 55
End: 2025-08-20
Payer: COMMERCIAL

## 2025-08-20 VITALS
WEIGHT: 168.4 LBS | DIASTOLIC BLOOD PRESSURE: 92 MMHG | SYSTOLIC BLOOD PRESSURE: 140 MMHG | BODY MASS INDEX: 28.06 KG/M2 | OXYGEN SATURATION: 97 % | HEIGHT: 65 IN | HEART RATE: 86 BPM

## 2025-08-20 DIAGNOSIS — J45.20 MILD INTERMITTENT ASTHMA, UNSPECIFIED WHETHER COMPLICATED: Primary | Chronic | ICD-10-CM

## 2025-08-20 DIAGNOSIS — R91.8 LUNG NODULES: Chronic | ICD-10-CM

## 2025-08-20 DIAGNOSIS — R06.2 WHEEZING: ICD-10-CM

## 2025-08-21 ENCOUNTER — TELEPHONE (OUTPATIENT)
Dept: SURGERY | Facility: CLINIC | Age: 55
End: 2025-08-21
Payer: COMMERCIAL

## 2025-08-22 ENCOUNTER — PRE-ADMISSION TESTING (OUTPATIENT)
Dept: PREADMISSION TESTING | Facility: HOSPITAL | Age: 55
End: 2025-08-22
Payer: COMMERCIAL

## 2025-08-22 ENCOUNTER — HOSPITAL ENCOUNTER (OUTPATIENT)
Dept: GENERAL RADIOLOGY | Facility: HOSPITAL | Age: 55
Discharge: HOME | End: 2025-08-22
Payer: COMMERCIAL

## 2025-08-22 PROCEDURE — 71045 X-RAY EXAM CHEST 1 VIEW: CPT

## 2025-08-28 ENCOUNTER — TELEPHONE (OUTPATIENT)
Dept: SURGERY | Facility: CLINIC | Age: 55
End: 2025-08-28
Payer: COMMERCIAL

## 2025-08-29 ENCOUNTER — ANESTHESIA EVENT (OUTPATIENT)
Dept: PERIOP | Facility: HOSPITAL | Age: 55
End: 2025-08-29
Payer: COMMERCIAL

## 2025-08-29 ENCOUNTER — ANESTHESIA (OUTPATIENT)
Dept: PERIOP | Facility: HOSPITAL | Age: 55
End: 2025-08-29
Payer: COMMERCIAL

## 2025-08-29 ENCOUNTER — HOSPITAL ENCOUNTER (OUTPATIENT)
Facility: HOSPITAL | Age: 55
Setting detail: HOSPITAL OUTPATIENT SURGERY
Discharge: HOME | End: 2025-08-29
Attending: STUDENT IN AN ORGANIZED HEALTH CARE EDUCATION/TRAINING PROGRAM | Admitting: STUDENT IN AN ORGANIZED HEALTH CARE EDUCATION/TRAINING PROGRAM
Payer: COMMERCIAL

## 2025-08-29 VITALS
DIASTOLIC BLOOD PRESSURE: 75 MMHG | RESPIRATION RATE: 16 BRPM | HEART RATE: 89 BPM | TEMPERATURE: 98.2 F | OXYGEN SATURATION: 95 % | SYSTOLIC BLOOD PRESSURE: 119 MMHG

## 2025-08-29 DIAGNOSIS — K80.20 GALLSTONES: ICD-10-CM

## 2025-08-29 PROCEDURE — 25010000002 ONDANSETRON PER 1 MG: Performed by: NURSE ANESTHETIST, CERTIFIED REGISTERED

## 2025-08-29 PROCEDURE — 25010000002 DEXAMETHASONE PER 1 MG: Performed by: STUDENT IN AN ORGANIZED HEALTH CARE EDUCATION/TRAINING PROGRAM

## 2025-08-29 PROCEDURE — 25010000002 CEFAZOLIN PER 500 MG: Performed by: STUDENT IN AN ORGANIZED HEALTH CARE EDUCATION/TRAINING PROGRAM

## 2025-08-29 PROCEDURE — 25010000002 LIDOCAINE 1 % SOLUTION 20 ML VIAL: Performed by: STUDENT IN AN ORGANIZED HEALTH CARE EDUCATION/TRAINING PROGRAM

## 2025-08-29 PROCEDURE — 25010000002 BUPIVACAINE 0.5 % SOLUTION 50 ML VIAL: Performed by: STUDENT IN AN ORGANIZED HEALTH CARE EDUCATION/TRAINING PROGRAM

## 2025-08-29 PROCEDURE — 25010000002 BUPIVACAINE (PF) 0.25 % SOLUTION 30 ML VIAL: Performed by: STUDENT IN AN ORGANIZED HEALTH CARE EDUCATION/TRAINING PROGRAM

## 2025-08-29 PROCEDURE — 25010000002 FENTANYL CITRATE (PF) 50 MCG/ML SOLUTION: Performed by: NURSE ANESTHETIST, CERTIFIED REGISTERED

## 2025-08-29 PROCEDURE — 25010000002 PROPOFOL 10 MG/ML EMULSION: Performed by: NURSE ANESTHETIST, CERTIFIED REGISTERED

## 2025-08-29 PROCEDURE — 25010000002 ENOXAPARIN PER 10 MG: Performed by: STUDENT IN AN ORGANIZED HEALTH CARE EDUCATION/TRAINING PROGRAM

## 2025-08-29 PROCEDURE — 25010000002 LIDOCAINE 1% - EPINEPHRINE 1:100000 1 %-1:100000 SOLUTION 10 ML VIAL: Performed by: STUDENT IN AN ORGANIZED HEALTH CARE EDUCATION/TRAINING PROGRAM

## 2025-08-29 PROCEDURE — 25010000002 DROPERIDOL PER 5 MG: Performed by: NURSE ANESTHETIST, CERTIFIED REGISTERED

## 2025-08-29 PROCEDURE — 25010000002 MORPHINE SULFATE (PF) 2 MG/ML SOLUTION 1 ML CARTRIDGE: Performed by: STUDENT IN AN ORGANIZED HEALTH CARE EDUCATION/TRAINING PROGRAM

## 2025-08-29 PROCEDURE — 25810000003 LACTATED RINGERS PER 1000 ML: Performed by: STUDENT IN AN ORGANIZED HEALTH CARE EDUCATION/TRAINING PROGRAM

## 2025-08-29 PROCEDURE — C1894 INTRO/SHEATH, NON-LASER: HCPCS | Performed by: STUDENT IN AN ORGANIZED HEALTH CARE EDUCATION/TRAINING PROGRAM

## 2025-08-29 PROCEDURE — 25010000002 DEXAMETHASONE PER 1 MG: Performed by: NURSE ANESTHETIST, CERTIFIED REGISTERED

## 2025-08-29 PROCEDURE — 25010000002 LIDOCAINE PF 2% 2 % SOLUTION: Performed by: NURSE ANESTHETIST, CERTIFIED REGISTERED

## 2025-08-29 PROCEDURE — 25010000002 SUGAMMADEX 200 MG/2ML SOLUTION: Performed by: NURSE ANESTHETIST, CERTIFIED REGISTERED

## 2025-08-29 RX ORDER — HYDROCODONE BITARTRATE AND ACETAMINOPHEN 10; 325 MG/1; MG/1
1 TABLET ORAL EVERY 4 HOURS PRN
Status: DISCONTINUED | OUTPATIENT
Start: 2025-08-29 | End: 2025-08-29 | Stop reason: HOSPADM

## 2025-08-29 RX ORDER — ONDANSETRON 2 MG/ML
4 INJECTION INTRAMUSCULAR; INTRAVENOUS ONCE AS NEEDED
Status: DISCONTINUED | OUTPATIENT
Start: 2025-08-29 | End: 2025-08-29 | Stop reason: HOSPADM

## 2025-08-29 RX ORDER — SODIUM CHLORIDE 0.9 % (FLUSH) 0.9 %
3-10 SYRINGE (ML) INJECTION AS NEEDED
Status: DISCONTINUED | OUTPATIENT
Start: 2025-08-29 | End: 2025-08-29 | Stop reason: HOSPADM

## 2025-08-29 RX ORDER — OXYCODONE HYDROCHLORIDE 5 MG/1
5 TABLET ORAL EVERY 8 HOURS PRN
Qty: 10 TABLET | Refills: 0 | Status: SHIPPED | OUTPATIENT
Start: 2025-08-29 | End: 2026-08-29

## 2025-08-29 RX ORDER — LABETALOL HYDROCHLORIDE 5 MG/ML
5 INJECTION, SOLUTION INTRAVENOUS
Status: DISCONTINUED | OUTPATIENT
Start: 2025-08-29 | End: 2025-08-29 | Stop reason: HOSPADM

## 2025-08-29 RX ORDER — PROPOFOL 10 MG/ML
VIAL (ML) INTRAVENOUS AS NEEDED
Status: DISCONTINUED | OUTPATIENT
Start: 2025-08-29 | End: 2025-08-29 | Stop reason: SURG

## 2025-08-29 RX ORDER — SODIUM CHLORIDE 0.9 % (FLUSH) 0.9 %
3 SYRINGE (ML) INJECTION AS NEEDED
Status: DISCONTINUED | OUTPATIENT
Start: 2025-08-29 | End: 2025-08-29 | Stop reason: HOSPADM

## 2025-08-29 RX ORDER — DROPERIDOL 2.5 MG/ML
0.62 INJECTION, SOLUTION INTRAMUSCULAR; INTRAVENOUS ONCE AS NEEDED
Status: DISCONTINUED | OUTPATIENT
Start: 2025-08-29 | End: 2025-08-29 | Stop reason: HOSPADM

## 2025-08-29 RX ORDER — SODIUM CHLORIDE 9 MG/ML
40 INJECTION, SOLUTION INTRAVENOUS AS NEEDED
Status: DISCONTINUED | OUTPATIENT
Start: 2025-08-29 | End: 2025-08-29 | Stop reason: HOSPADM

## 2025-08-29 RX ORDER — SODIUM CHLORIDE, SODIUM LACTATE, POTASSIUM CHLORIDE, CALCIUM CHLORIDE 600; 310; 30; 20 MG/100ML; MG/100ML; MG/100ML; MG/100ML
100 INJECTION, SOLUTION INTRAVENOUS CONTINUOUS
Status: DISCONTINUED | OUTPATIENT
Start: 2025-08-29 | End: 2025-08-29 | Stop reason: HOSPADM

## 2025-08-29 RX ORDER — INDOCYANINE GREEN AND WATER 25 MG
3.75 KIT INJECTION ONCE
Status: COMPLETED | OUTPATIENT
Start: 2025-08-29 | End: 2025-08-29

## 2025-08-29 RX ORDER — SODIUM CHLORIDE, SODIUM LACTATE, POTASSIUM CHLORIDE, CALCIUM CHLORIDE 600; 310; 30; 20 MG/100ML; MG/100ML; MG/100ML; MG/100ML
1000 INJECTION, SOLUTION INTRAVENOUS CONTINUOUS
Status: DISCONTINUED | OUTPATIENT
Start: 2025-08-29 | End: 2025-08-29 | Stop reason: HOSPADM

## 2025-08-29 RX ORDER — ONDANSETRON 4 MG/1
4 TABLET, FILM COATED ORAL EVERY 8 HOURS PRN
Qty: 15 TABLET | Refills: 0 | Status: SHIPPED | OUTPATIENT
Start: 2025-08-29 | End: 2026-08-29

## 2025-08-29 RX ORDER — SODIUM CHLORIDE 0.9 % (FLUSH) 0.9 %
10 SYRINGE (ML) INJECTION AS NEEDED
Status: DISCONTINUED | OUTPATIENT
Start: 2025-08-29 | End: 2025-08-29 | Stop reason: HOSPADM

## 2025-08-29 RX ORDER — MIDAZOLAM HYDROCHLORIDE 2 MG/2ML
1 INJECTION, SOLUTION INTRAMUSCULAR; INTRAVENOUS
Status: DISCONTINUED | OUTPATIENT
Start: 2025-08-29 | End: 2025-08-29 | Stop reason: HOSPADM

## 2025-08-29 RX ORDER — FENTANYL CITRATE 50 UG/ML
INJECTION, SOLUTION INTRAMUSCULAR; INTRAVENOUS AS NEEDED
Status: DISCONTINUED | OUTPATIENT
Start: 2025-08-29 | End: 2025-08-29 | Stop reason: SURG

## 2025-08-29 RX ORDER — SODIUM CHLORIDE 0.9 % (FLUSH) 0.9 %
3 SYRINGE (ML) INJECTION EVERY 12 HOURS SCHEDULED
Status: DISCONTINUED | OUTPATIENT
Start: 2025-08-29 | End: 2025-08-29 | Stop reason: HOSPADM

## 2025-08-29 RX ORDER — NALOXONE HCL 0.4 MG/ML
0.4 VIAL (ML) INJECTION AS NEEDED
Status: DISCONTINUED | OUTPATIENT
Start: 2025-08-29 | End: 2025-08-29 | Stop reason: HOSPADM

## 2025-08-29 RX ORDER — DROPERIDOL 2.5 MG/ML
INJECTION, SOLUTION INTRAMUSCULAR; INTRAVENOUS AS NEEDED
Status: DISCONTINUED | OUTPATIENT
Start: 2025-08-29 | End: 2025-08-29 | Stop reason: SURG

## 2025-08-29 RX ORDER — MAGNESIUM HYDROXIDE 1200 MG/15ML
LIQUID ORAL AS NEEDED
Status: DISCONTINUED | OUTPATIENT
Start: 2025-08-29 | End: 2025-08-29 | Stop reason: HOSPADM

## 2025-08-29 RX ORDER — HYDROCODONE BITARTRATE AND ACETAMINOPHEN 5; 325 MG/1; MG/1
1 TABLET ORAL EVERY 4 HOURS PRN
Status: DISCONTINUED | OUTPATIENT
Start: 2025-08-29 | End: 2025-08-29 | Stop reason: HOSPADM

## 2025-08-29 RX ORDER — ONDANSETRON 2 MG/ML
INJECTION INTRAMUSCULAR; INTRAVENOUS AS NEEDED
Status: DISCONTINUED | OUTPATIENT
Start: 2025-08-29 | End: 2025-08-29 | Stop reason: SURG

## 2025-08-29 RX ORDER — DEXAMETHASONE SODIUM PHOSPHATE 4 MG/ML
INJECTION, SOLUTION INTRA-ARTICULAR; INTRALESIONAL; INTRAMUSCULAR; INTRAVENOUS; SOFT TISSUE AS NEEDED
Status: DISCONTINUED | OUTPATIENT
Start: 2025-08-29 | End: 2025-08-29 | Stop reason: SURG

## 2025-08-29 RX ORDER — FENTANYL CITRATE 50 UG/ML
50 INJECTION, SOLUTION INTRAMUSCULAR; INTRAVENOUS
Status: DISCONTINUED | OUTPATIENT
Start: 2025-08-29 | End: 2025-08-29 | Stop reason: HOSPADM

## 2025-08-29 RX ORDER — IBUPROFEN 200 MG
600 TABLET ORAL EVERY 8 HOURS
Start: 2025-08-29 | End: 2026-08-29

## 2025-08-29 RX ORDER — ENOXAPARIN SODIUM 100 MG/ML
40 INJECTION SUBCUTANEOUS DAILY
Status: DISCONTINUED | OUTPATIENT
Start: 2025-08-29 | End: 2025-08-29 | Stop reason: HOSPADM

## 2025-08-29 RX ORDER — ACETAMINOPHEN 325 MG/1
975 TABLET ORAL EVERY 8 HOURS
Start: 2025-08-29 | End: 2026-08-29

## 2025-08-29 RX ORDER — ACETAMINOPHEN 500 MG
1000 TABLET ORAL ONCE
Status: COMPLETED | OUTPATIENT
Start: 2025-08-29 | End: 2025-08-29

## 2025-08-29 RX ORDER — ROCURONIUM BROMIDE 10 MG/ML
INJECTION, SOLUTION INTRAVENOUS AS NEEDED
Status: DISCONTINUED | OUTPATIENT
Start: 2025-08-29 | End: 2025-08-29 | Stop reason: SURG

## 2025-08-29 RX ORDER — LIDOCAINE HYDROCHLORIDE 20 MG/ML
INJECTION, SOLUTION EPIDURAL; INFILTRATION; INTRACAUDAL; PERINEURAL AS NEEDED
Status: DISCONTINUED | OUTPATIENT
Start: 2025-08-29 | End: 2025-08-29 | Stop reason: SURG

## 2025-08-29 RX ORDER — IBUPROFEN 600 MG/1
600 TABLET, FILM COATED ORAL EVERY 6 HOURS PRN
Status: DISCONTINUED | OUTPATIENT
Start: 2025-08-29 | End: 2025-08-29 | Stop reason: HOSPADM

## 2025-08-29 RX ORDER — FLUMAZENIL 0.1 MG/ML
0.2 INJECTION INTRAVENOUS AS NEEDED
Status: DISCONTINUED | OUTPATIENT
Start: 2025-08-29 | End: 2025-08-29 | Stop reason: HOSPADM

## 2025-08-29 RX ADMIN — ACETAMINOPHEN 1000 MG: 500 TABLET, FILM COATED ORAL at 08:36

## 2025-08-29 RX ADMIN — ENOXAPARIN SODIUM 40 MG: 100 INJECTION SUBCUTANEOUS at 08:37

## 2025-08-29 RX ADMIN — ROCURONIUM BROMIDE 40 MG: 10 INJECTION, SOLUTION INTRAVENOUS at 09:20

## 2025-08-29 RX ADMIN — FENTANYL CITRATE 100 MCG: 50 INJECTION, SOLUTION INTRAMUSCULAR; INTRAVENOUS at 09:18

## 2025-08-29 RX ADMIN — HYDROCODONE BITARTRATE AND ACETAMINOPHEN 1 TABLET: 5; 325 TABLET ORAL at 10:40

## 2025-08-29 RX ADMIN — PROPOFOL 150 MG: 10 INJECTION, EMULSION INTRAVENOUS at 09:19

## 2025-08-29 RX ADMIN — DEXAMETHASONE SODIUM PHOSPHATE 4 MG: 4 INJECTION, SOLUTION INTRA-ARTICULAR; INTRALESIONAL; INTRAMUSCULAR; INTRAVENOUS; SOFT TISSUE at 09:27

## 2025-08-29 RX ADMIN — LIDOCAINE HYDROCHLORIDE 100 MG: 20 INJECTION, SOLUTION EPIDURAL; INFILTRATION; INTRACAUDAL; PERINEURAL at 09:18

## 2025-08-29 RX ADMIN — INDOCYANINE GREEN AND WATER 3.75 MG: KIT at 08:36

## 2025-08-29 RX ADMIN — SODIUM CHLORIDE, POTASSIUM CHLORIDE, SODIUM LACTATE AND CALCIUM CHLORIDE 1000 ML: 600; 310; 30; 20 INJECTION, SOLUTION INTRAVENOUS at 07:20

## 2025-08-29 RX ADMIN — SUGAMMADEX 200 MG: 100 INJECTION, SOLUTION INTRAVENOUS at 09:49

## 2025-08-29 RX ADMIN — ONDANSETRON 4 MG: 2 INJECTION, SOLUTION INTRAMUSCULAR; INTRAVENOUS at 09:39

## 2025-08-29 RX ADMIN — CEFAZOLIN 2 G: 2 INJECTION, POWDER, FOR SOLUTION INTRAMUSCULAR; INTRAVENOUS at 09:25

## 2025-08-29 RX ADMIN — DROPERIDOL 1.25 MG: 2.5 INJECTION, SOLUTION INTRAMUSCULAR; INTRAVENOUS at 09:44
